# Patient Record
Sex: MALE | Race: WHITE
[De-identification: names, ages, dates, MRNs, and addresses within clinical notes are randomized per-mention and may not be internally consistent; named-entity substitution may affect disease eponyms.]

---

## 2017-03-22 PROBLEM — Z00.00 ENCOUNTER FOR PREVENTIVE HEALTH EXAMINATION: Status: ACTIVE | Noted: 2017-03-22

## 2017-04-04 ENCOUNTER — APPOINTMENT (OUTPATIENT)
Dept: UROLOGY | Facility: CLINIC | Age: 62
End: 2017-04-04

## 2017-04-10 ENCOUNTER — APPOINTMENT (OUTPATIENT)
Dept: UROLOGY | Facility: CLINIC | Age: 62
End: 2017-04-10

## 2017-07-31 ENCOUNTER — APPOINTMENT (OUTPATIENT)
Dept: UROLOGY | Facility: CLINIC | Age: 62
End: 2017-07-31

## 2019-10-07 ENCOUNTER — APPOINTMENT (OUTPATIENT)
Dept: CARDIOLOGY | Facility: CLINIC | Age: 64
End: 2019-10-07
Payer: MEDICARE

## 2019-10-07 PROCEDURE — 99214 OFFICE O/P EST MOD 30 MIN: CPT

## 2019-10-07 PROCEDURE — 93000 ELECTROCARDIOGRAM COMPLETE: CPT

## 2020-06-24 ENCOUNTER — APPOINTMENT (OUTPATIENT)
Dept: CARDIOLOGY | Facility: CLINIC | Age: 65
End: 2020-06-24

## 2021-10-17 ENCOUNTER — TRANSCRIPTION ENCOUNTER (OUTPATIENT)
Age: 66
End: 2021-10-17

## 2022-06-25 ENCOUNTER — NON-APPOINTMENT (OUTPATIENT)
Age: 67
End: 2022-06-25

## 2022-09-20 ENCOUNTER — NON-APPOINTMENT (OUTPATIENT)
Age: 67
End: 2022-09-20

## 2022-09-21 ENCOUNTER — NON-APPOINTMENT (OUTPATIENT)
Age: 67
End: 2022-09-21

## 2022-11-22 ENCOUNTER — APPOINTMENT (OUTPATIENT)
Dept: UROLOGY | Facility: CLINIC | Age: 67
End: 2022-11-22

## 2022-11-22 DIAGNOSIS — Z78.9 OTHER SPECIFIED HEALTH STATUS: ICD-10-CM

## 2022-11-22 DIAGNOSIS — I10 ESSENTIAL (PRIMARY) HYPERTENSION: ICD-10-CM

## 2022-11-22 DIAGNOSIS — E07.9 DISORDER OF THYROID, UNSPECIFIED: ICD-10-CM

## 2022-11-22 DIAGNOSIS — K21.9 GASTRO-ESOPHAGEAL REFLUX DISEASE W/OUT ESOPHAGITIS: ICD-10-CM

## 2022-11-22 PROCEDURE — 99204 OFFICE O/P NEW MOD 45 MIN: CPT

## 2022-11-22 RX ORDER — ALENDRONATE SODIUM 70 MG/1
70 TABLET ORAL
Qty: 12 | Refills: 0 | Status: ACTIVE | COMMUNITY
Start: 2022-09-12

## 2022-11-22 RX ORDER — METFORMIN ER 500 MG 500 MG/1
500 TABLET ORAL
Qty: 180 | Refills: 0 | Status: ACTIVE | COMMUNITY
Start: 2022-09-01

## 2022-11-22 RX ORDER — TAMSULOSIN HYDROCHLORIDE 0.4 MG/1
0.4 CAPSULE ORAL
Qty: 180 | Refills: 0 | Status: ACTIVE | COMMUNITY
Start: 2022-07-11

## 2022-11-22 RX ORDER — AZATHIOPRINE 50 1/1
50 TABLET ORAL
Qty: 540 | Refills: 0 | Status: ACTIVE | COMMUNITY
Start: 2022-04-21

## 2022-11-22 RX ORDER — SIMVASTATIN 20 MG/1
20 TABLET, FILM COATED ORAL
Qty: 90 | Refills: 0 | Status: ACTIVE | COMMUNITY
Start: 2022-05-09

## 2022-11-22 RX ORDER — LEVOTHYROXINE SODIUM 0.05 MG/1
50 TABLET ORAL
Qty: 90 | Refills: 0 | Status: ACTIVE | COMMUNITY
Start: 2022-07-05

## 2022-11-22 RX ORDER — ALPRAZOLAM 0.5 MG/1
0.5 TABLET ORAL
Qty: 60 | Refills: 0 | Status: ACTIVE | COMMUNITY
Start: 2022-05-31

## 2022-11-22 RX ORDER — ROPINIROLE 0.5 MG/1
0.5 TABLET, FILM COATED ORAL
Qty: 180 | Refills: 0 | Status: ACTIVE | COMMUNITY
Start: 2022-09-21

## 2022-11-22 RX ORDER — METOPROLOL SUCCINATE 25 MG/1
25 TABLET, EXTENDED RELEASE ORAL
Qty: 90 | Refills: 0 | Status: ACTIVE | COMMUNITY
Start: 2022-09-15

## 2022-11-22 RX ORDER — TRAZODONE HYDROCHLORIDE 100 MG/1
100 TABLET ORAL
Qty: 90 | Refills: 0 | Status: ACTIVE | COMMUNITY
Start: 2022-07-26

## 2022-11-22 RX ORDER — OXYCODONE AND ACETAMINOPHEN 10; 325 MG/1; MG/1
10-325 TABLET ORAL
Qty: 90 | Refills: 0 | Status: ACTIVE | COMMUNITY
Start: 2022-09-16

## 2022-11-22 RX ORDER — PREDNISONE 20 MG/1
20 TABLET ORAL
Qty: 70 | Refills: 0 | Status: ACTIVE | COMMUNITY
Start: 2022-05-05

## 2022-11-22 RX ORDER — NEOMYCIN SULFATE, POLYMYXIN B SULFATE, HYDROCORTISONE 3.5; 10000; 1 MG/ML; [USP'U]/ML; MG/ML
1 SOLUTION/ DROPS AURICULAR (OTIC)
Qty: 10 | Refills: 0 | Status: ACTIVE | COMMUNITY
Start: 2022-09-21

## 2022-11-22 NOTE — ASSESSMENT
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 Pt is currently on Warfarin. We discussed these issues in detail. It would be nice to remove the catheter as soon as possible. I've asked him to follow up with a VUDS to define whether he has obstruction or a non-functioning bladder. Once that is done, we will make further decisions including teaching the pt CIC if that's the main option available. All his questions were otherwise answered. Total time = 45 mins. \par \par The submitted E/M billing level for this visit reflects the total time spent on the day of the visit including face-to-face time spent with the patient, non-face-to-face review of medical records and relevant information, documentation, and asynchronous communication with the patient after a visit via phone, email, or patient’s EHR portal after the visit. \par The medical records reviewed are either scanned into the chart or reviewed with the patient using a patient’s electronic medical records portal for patients with records not available to [facility] via electronic transmission platforms from other institutions and labs. \par Time spend counseling and performing coordination of care was also included in determining the appropriate EM billing level.\par \par I have reviewed and verified information regarding the chief complaint and history recorded by the ancillary staff and/or the patient. I have independently reviewed and interpreted tests performed by other physicians and facilities as necessary. \par \par I have discussed with the patient differential diagnosis, reason for auxiliary tests if ordered, risks, benefits, alternatives, and complications of each form of therapy were discussed.\par

## 2022-11-22 NOTE — PHYSICAL EXAM
[General Appearance - Well Nourished] : well nourished [Normal Appearance] : normal appearance [General Appearance - In No Acute Distress] : no acute distress [Abdomen Soft] : soft [Abdomen Tenderness] : non-tender [Abdomen Mass (___ Cm)] : no abdominal mass palpated [Costovertebral Angle Tenderness] : no ~M costovertebral angle tenderness [Penis Abnormality] : normal circumcised penis [Oriented To Time, Place, And Person] : oriented to person, place, and time [Affect] : the affect was normal [FreeTextEntry1] : Pt has a Martinez catheter in place. There is some erosion of the meatus. A penile prosthesis in place with no ulceration at the tip of the phallus.

## 2022-11-22 NOTE — HISTORY OF PRESENT ILLNESS
[FreeTextEntry1] : 67 y/o male with a hx of urinary retention and had an indwelling catheter placed about a month ago. He has had several unsuccessful voiding trials and comes in for further evaluation today. Pt has a hx of multiple medical issues including possible CVA, Devic's disease, recent possible PE, and chronic atrial fibrillation. He had a penile prosthesis placed many years ago with possible revision. Pt was fairly functional, but had a fall on a cruise and was admitted to [facility] where some of the diagnosis noted above were made. Records from the hospital was not available today. Being that the pt does have a diagnosis of MS for many years, it is possible that he could have a neurogenic disease, although that is unclear. Pt was seen accompanied by an aid from the nursing home and his sister. \par \par Ultrasonography 11/2/22:\par - no hydronephrosis \par \par Lab Work 10/2022:\par BUN: 16\par Creatinine: 0.9\par eGFR: 93\par hbA1c: 4.8\par \par pmhx: PE, HLD, Type 2 DM, ganglionitis, hypothyroidism, anxiety, amyotrophic lateral sclerosis, multiple sclerosis, atrial fibrillation, osteoporosis, reflux, BPH, anemia. \par \par pshx: penile prosthesis placement\par \par

## 2022-11-28 ENCOUNTER — APPOINTMENT (OUTPATIENT)
Dept: UROLOGY | Facility: CLINIC | Age: 67
End: 2022-11-28

## 2022-12-29 ENCOUNTER — APPOINTMENT (OUTPATIENT)
Dept: UROLOGY | Facility: HOSPITAL | Age: 67
End: 2022-12-29
Payer: MEDICARE

## 2022-12-29 ENCOUNTER — OUTPATIENT (OUTPATIENT)
Dept: OUTPATIENT SERVICES | Facility: HOSPITAL | Age: 67
LOS: 1 days | Discharge: HOME | End: 2022-12-29

## 2022-12-29 DIAGNOSIS — R33.9 RETENTION OF URINE, UNSPECIFIED: ICD-10-CM

## 2022-12-29 PROCEDURE — 51784 ANAL/URINARY MUSCLE STUDY: CPT | Mod: 26

## 2022-12-29 PROCEDURE — 76000 FLUOROSCOPY <1 HR PHYS/QHP: CPT | Mod: 26,59

## 2022-12-29 PROCEDURE — 51797 INTRAABDOMINAL PRESSURE TEST: CPT | Mod: 26

## 2022-12-29 PROCEDURE — 51728 CYSTOMETROGRAM W/VP: CPT | Mod: 26

## 2022-12-29 PROCEDURE — 51600 INJECTION FOR BLADDER X-RAY: CPT

## 2023-03-03 ENCOUNTER — APPOINTMENT (OUTPATIENT)
Dept: UROLOGY | Facility: CLINIC | Age: 68
End: 2023-03-03

## 2023-03-24 ENCOUNTER — OUTPATIENT (OUTPATIENT)
Dept: OUTPATIENT SERVICES | Facility: HOSPITAL | Age: 68
LOS: 1 days | End: 2023-03-24
Payer: MEDICARE

## 2023-03-24 DIAGNOSIS — R10.9 UNSPECIFIED ABDOMINAL PAIN: ICD-10-CM

## 2023-03-24 PROCEDURE — 74177 CT ABD & PELVIS W/CONTRAST: CPT

## 2023-03-24 PROCEDURE — 74177 CT ABD & PELVIS W/CONTRAST: CPT | Mod: 26

## 2023-03-25 DIAGNOSIS — R10.9 UNSPECIFIED ABDOMINAL PAIN: ICD-10-CM

## 2023-05-01 ENCOUNTER — OUTPATIENT (OUTPATIENT)
Dept: OUTPATIENT SERVICES | Facility: HOSPITAL | Age: 68
LOS: 1 days | Discharge: HOME | End: 2023-05-01
Payer: MEDICARE

## 2023-05-01 ENCOUNTER — TRANSCRIPTION ENCOUNTER (OUTPATIENT)
Age: 68
End: 2023-05-01

## 2023-05-01 VITALS
HEIGHT: 65.98 IN | DIASTOLIC BLOOD PRESSURE: 88 MMHG | WEIGHT: 167.99 LBS | RESPIRATION RATE: 18 BRPM | TEMPERATURE: 98 F | HEART RATE: 80 BPM | OXYGEN SATURATION: 99 % | SYSTOLIC BLOOD PRESSURE: 166 MMHG

## 2023-05-01 VITALS
DIASTOLIC BLOOD PRESSURE: 91 MMHG | TEMPERATURE: 97 F | RESPIRATION RATE: 18 BRPM | SYSTOLIC BLOOD PRESSURE: 141 MMHG | OXYGEN SATURATION: 97 % | HEART RATE: 87 BPM

## 2023-05-01 DIAGNOSIS — N40.1 BENIGN PROSTATIC HYPERPLASIA WITH LOWER URINARY TRACT SYMPTOMS: ICD-10-CM

## 2023-05-01 LAB — GLUCOSE BLDC GLUCOMTR-MCNC: 84 MG/DL — SIGNIFICANT CHANGE UP (ref 70–99)

## 2023-05-01 PROCEDURE — 76942 ECHO GUIDE FOR BIOPSY: CPT | Mod: 26

## 2023-05-01 PROCEDURE — 51102 DRAIN BL W/CATH INSERTION: CPT

## 2023-05-01 PROCEDURE — 76942 ECHO GUIDE FOR BIOPSY: CPT

## 2023-05-01 PROCEDURE — 82962 GLUCOSE BLOOD TEST: CPT

## 2023-05-01 PROCEDURE — C1729: CPT

## 2023-05-01 PROCEDURE — C1769: CPT

## 2023-05-01 RX ORDER — CEFTRIAXONE 500 MG/1
1000 INJECTION, POWDER, FOR SOLUTION INTRAMUSCULAR; INTRAVENOUS ONCE
Refills: 0 | Status: DISCONTINUED | OUTPATIENT
Start: 2023-05-01 | End: 2023-05-01

## 2023-05-01 NOTE — CHART NOTE - NSCHARTNOTEFT_GEN_A_CORE
PACU ANESTHESIA ADMISSION NOTE      Procedure:   Post op diagnosis:      ____  Intubated  TV:______       Rate: ______      FiO2: ______    _x___  Patent Airway    _x___  Full return of protective reflexes    _x___  Full recovery from anesthesia / back to baseline status    Vitals:  T(C): 36.1 (05-01-23 @ 10:23), Max: 36.4 (05-01-23 @ 07:12)  HR: 87 (05-01-23 @ 10:23) (77 - 103)  BP: 141/91 (05-01-23 @ 10:23) (116/76 - 166/88)  RR: 18 (05-01-23 @ 10:23) (18 - 18)  SpO2: 97% (05-01-23 @ 10:23) (97% - 99%)    Mental Status:  _x___ Awake   _____ Alert   _____ Drowsy   _____ Sedated    Nausea/Vomiting:  _x___  NO       ______Yes,   See Post - Op Orders         Pain Scale (0-10):  __0___    Treatment: _x___ None    ____ See Post - Op/PCA Orders    Post - Operative Fluids:   __x__ Oral   ____ See Post - Op Orders    Plan: Discharge:   _x___Home       _____Floor     _____Critical Care    _____  Other:_________________    Comments:  No anesthesia issues or complications noted.  Discharge when criteria met.

## 2023-05-01 NOTE — ASU DISCHARGE PLAN (ADULT/PEDIATRIC) - NS MD DC FALL RISK RISK
For information on Fall & Injury Prevention, visit: [email or web address] OR  [email or web address] OR  https://www.cdc.gov/steadi/patient.html

## 2023-05-01 NOTE — ASU PATIENT PROFILE, ADULT - FALL HARM RISK - HARM RISK INTERVENTIONS

## 2023-05-01 NOTE — PROGRESS NOTE ADULT - SUBJECTIVE AND OBJECTIVE BOX
Procedure: Ultrasound and fluoroscopy-guided suprapubic catheter placement. Images saved to PACS.    Clinical Information: Urinary retention with multiple failed with trials. V will require long-term Martinez.    Technique: Informed consent was obtained. The patient was placed supine on the fluoroscopy table. The pelvis was prepped and draped in the usual sterile fashion. Timeout was performed. 1% lidocaine with epinephrine was used for local anesthesia.    Under ultrasound guidance, a 5 Tamazight needle/catheter combination was advanced percutaneously into the bladder, the inner needle was removed. Via the catheter a small amount of contrast was injected confirming position within the bladder. Under fluoroscopic guidance, a 0.035 wire was inserted into the bladder. The tract was serially dilated and eventually a 16 Tamazight locking pigtail catheter was advanced under fluoroscopic guidance. Aspiration of the catheter yielded urine. The catheter was secured to the skin and a dry, sterile gauze was applied. The catheter was placed to gravity drain.    Sedation: Provided by the anesthesiology service.    Impression: Successful 16French suprapubic catheter placement.

## 2023-05-02 PROBLEM — I48.91 UNSPECIFIED ATRIAL FIBRILLATION: Chronic | Status: ACTIVE | Noted: 2023-05-01

## 2023-05-02 PROBLEM — N32.81 OVERACTIVE BLADDER: Chronic | Status: ACTIVE | Noted: 2023-05-01

## 2023-05-09 DIAGNOSIS — R33.9 RETENTION OF URINE, UNSPECIFIED: ICD-10-CM

## 2023-05-22 ENCOUNTER — OUTPATIENT (OUTPATIENT)
Dept: OUTPATIENT SERVICES | Facility: HOSPITAL | Age: 68
LOS: 1 days | Discharge: HOME | End: 2023-05-22
Payer: MEDICARE

## 2023-05-22 ENCOUNTER — TRANSCRIPTION ENCOUNTER (OUTPATIENT)
Age: 68
End: 2023-05-22

## 2023-05-22 VITALS
WEIGHT: 149.91 LBS | OXYGEN SATURATION: 99 % | HEIGHT: 66 IN | RESPIRATION RATE: 14 BRPM | DIASTOLIC BLOOD PRESSURE: 85 MMHG | SYSTOLIC BLOOD PRESSURE: 143 MMHG | HEART RATE: 77 BPM | TEMPERATURE: 97 F

## 2023-05-22 VITALS
DIASTOLIC BLOOD PRESSURE: 97 MMHG | HEART RATE: 81 BPM | OXYGEN SATURATION: 99 % | SYSTOLIC BLOOD PRESSURE: 147 MMHG | RESPIRATION RATE: 15 BRPM

## 2023-05-22 DIAGNOSIS — R33.9 RETENTION OF URINE, UNSPECIFIED: ICD-10-CM

## 2023-05-22 DIAGNOSIS — N40.1 BENIGN PROSTATIC HYPERPLASIA WITH LOWER URINARY TRACT SYMPTOMS: ICD-10-CM

## 2023-05-22 DIAGNOSIS — Z46.6 ENCOUNTER FOR FITTING AND ADJUSTMENT OF URINARY DEVICE: ICD-10-CM

## 2023-05-22 PROCEDURE — C2627: CPT

## 2023-05-22 PROCEDURE — C1769: CPT

## 2023-05-22 PROCEDURE — 51705 CHANGE OF BLADDER TUBE: CPT

## 2023-05-22 PROCEDURE — 75984 XRAY CONTROL CATHETER CHANGE: CPT | Mod: 26

## 2023-05-22 PROCEDURE — 75984 XRAY CONTROL CATHETER CHANGE: CPT

## 2023-05-22 RX ORDER — CEFTRIAXONE 500 MG/1
1000 INJECTION, POWDER, FOR SOLUTION INTRAMUSCULAR; INTRAVENOUS ONCE
Refills: 0 | Status: DISCONTINUED | OUTPATIENT
Start: 2023-05-22 | End: 2023-05-22

## 2023-05-22 NOTE — ASU PATIENT PROFILE, ADULT - NSICDXPASTMEDICALHX_GEN_ALL_CORE_FT
PAST MEDICAL HISTORY:  Atrial fibrillation     BPH (benign prostatic hyperplasia)     Overactive bladder     Type 2 diabetes mellitus

## 2023-05-22 NOTE — ASU PATIENT PROFILE, ADULT - TOBACCO USE
Activity as tolerated. Rest as needed. You may eat a low fat diet. Do not lift anything heavier than 10 pounds. You may take motrin or advil for mild pain as needed, in addition to prescribed narcotic medication. Do not drive while taking narcotic pain medication. You may take over the counter stool softeners as needed. Call for any fever over 101, nausea, vomiting, severe pain, no passing of gas or bowel movement. You may shower and pat dry abdomen. Leave the white steri strips in place; they will fall off in 5-7 days. Former smoker

## 2023-05-22 NOTE — PROGRESS NOTE ADULT - SUBJECTIVE AND OBJECTIVE BOX
INTERVENTIONAL RADIOLOGY BRIEF-OPERATIVE NOTE    Procedure:  Fluoroscopic exchange of SPT    Pre-Op Diagnosis:  Urinary retention    Post-Op Diagnosis:  Same    Attending:  Cyndi (IR) / Yulia (Anaesthesia)  Resident:  None    Antibiotic Prophylaxis:  Ancef, 2g, ivpb    Anesthesia (type):  [X] General Anesthesia  [ ] Sedation  [ ] Spinal Anesthesia  [ ] Local/Regional    Total Face-to-Face Sedation Time:  9 minutes    Contrast:  Visipaque 20cc in 200cc 0.9%NS to urinary bladder, drained    Blood Loss:  0cc    Condition:   [ ] Critical  [ ] Serious  [ ] Fair   [X] Good    Findings/Follow up Plan of Care:  16-English pigtail catheter exchanged over a guidewire for a 16-Fr Councill catheter.  Draining clear urine externally to gravity, post-procedure.  Patient tolerated well, without incident.    Specimens Removed:  None    Implants:  None    Complications:  None immediate.    Disposition:  Discharge home.  Patient should return to IR for routine catheter exchange in 8 weeks.      Please call Interventional Radiology [phone] with any questions, concerns, or issues.

## 2023-05-22 NOTE — ASU DISCHARGE PLAN (ADULT/PEDIATRIC) - PROCEDURE
Fluoroscopically-directed exchange of suprapubic pigtail catheter to [place] catheter Fluoroscopically-directed exchange of suprapubic pigtail catheter to [place] catheter

## 2023-05-22 NOTE — PROGRESS NOTE ADULT - SUBJECTIVE AND OBJECTIVE BOX
PRE-OPERATIVE DAY OF PROCEDURE EVALUATION:     I have personally seen and examined this patient.  I agree with the history and physical which I have reviewed and noted any changes below:     Plan is for fluoroscopic exchange of suprapubic pigtail catheter for [place] catheter with anaesthesia.    Procedure/ risks/ benefits/ goals/ alternatives were explained.  All questions answered. Informed content obtained from patient.  Consent placed in chart.

## 2023-05-24 PROBLEM — N40.0 BENIGN PROSTATIC HYPERPLASIA WITHOUT LOWER URINARY TRACT SYMPTOMS: Chronic | Status: ACTIVE | Noted: 2023-05-22

## 2023-06-18 ENCOUNTER — NON-APPOINTMENT (OUTPATIENT)
Age: 68
End: 2023-06-18

## 2023-06-29 ENCOUNTER — NON-APPOINTMENT (OUTPATIENT)
Age: 68
End: 2023-06-29

## 2023-07-07 ENCOUNTER — APPOINTMENT (OUTPATIENT)
Dept: UROLOGY | Facility: CLINIC | Age: 68
End: 2023-07-07
Payer: MEDICARE

## 2023-07-07 VITALS
RESPIRATION RATE: 16 BRPM | SYSTOLIC BLOOD PRESSURE: 151 MMHG | OXYGEN SATURATION: 98 % | TEMPERATURE: 97.1 F | DIASTOLIC BLOOD PRESSURE: 102 MMHG | HEIGHT: 67 IN | HEART RATE: 72 BPM | WEIGHT: 168 LBS | BODY MASS INDEX: 26.37 KG/M2

## 2023-07-07 PROCEDURE — 51705 CHANGE OF BLADDER TUBE: CPT

## 2023-07-19 NOTE — ASU PREOP CHECKLIST - AS BP NONINV METHOD

## 2023-08-15 ENCOUNTER — APPOINTMENT (OUTPATIENT)
Dept: UROLOGY | Facility: CLINIC | Age: 68
End: 2023-08-15

## 2023-10-03 ENCOUNTER — APPOINTMENT (OUTPATIENT)
Dept: UROLOGY | Facility: CLINIC | Age: 68
End: 2023-10-03
Payer: MEDICARE

## 2023-10-03 PROCEDURE — 51702 INSERT TEMP BLADDER CATH: CPT

## 2023-10-05 ENCOUNTER — APPOINTMENT (OUTPATIENT)
Dept: UROLOGY | Facility: CLINIC | Age: 68
End: 2023-10-05
Payer: MEDICARE

## 2023-10-05 ENCOUNTER — NON-APPOINTMENT (OUTPATIENT)
Age: 68
End: 2023-10-05

## 2023-10-05 PROCEDURE — 99214 OFFICE O/P EST MOD 30 MIN: CPT

## 2023-10-06 ENCOUNTER — NON-APPOINTMENT (OUTPATIENT)
Age: 68
End: 2023-10-06

## 2023-10-07 ENCOUNTER — EMERGENCY (EMERGENCY)
Facility: HOSPITAL | Age: 68
LOS: 0 days | Discharge: HOME | End: 2023-10-07
Attending: EMERGENCY MEDICINE
Payer: MEDICARE

## 2023-10-07 VITALS
SYSTOLIC BLOOD PRESSURE: 149 MMHG | HEART RATE: 73 BPM | TEMPERATURE: 99 F | OXYGEN SATURATION: 99 % | DIASTOLIC BLOOD PRESSURE: 70 MMHG | WEIGHT: 173.06 LBS | RESPIRATION RATE: 18 BRPM

## 2023-10-07 DIAGNOSIS — Z79.01 LONG TERM (CURRENT) USE OF ANTICOAGULANTS: ICD-10-CM

## 2023-10-07 DIAGNOSIS — N40.0 BENIGN PROSTATIC HYPERPLASIA WITHOUT LOWER URINARY TRACT SYMPTOMS: ICD-10-CM

## 2023-10-07 DIAGNOSIS — G35 MULTIPLE SCLEROSIS: ICD-10-CM

## 2023-10-07 DIAGNOSIS — I48.91 UNSPECIFIED ATRIAL FIBRILLATION: ICD-10-CM

## 2023-10-07 DIAGNOSIS — R31.9 HEMATURIA, UNSPECIFIED: ICD-10-CM

## 2023-10-07 DIAGNOSIS — E11.9 TYPE 2 DIABETES MELLITUS WITHOUT COMPLICATIONS: ICD-10-CM

## 2023-10-07 DIAGNOSIS — Z86.73 PERSONAL HISTORY OF TRANSIENT ISCHEMIC ATTACK (TIA), AND CEREBRAL INFARCTION WITHOUT RESIDUAL DEFICITS: ICD-10-CM

## 2023-10-07 DIAGNOSIS — N39.0 URINARY TRACT INFECTION, SITE NOT SPECIFIED: ICD-10-CM

## 2023-10-07 DIAGNOSIS — Z87.442 PERSONAL HISTORY OF URINARY CALCULI: ICD-10-CM

## 2023-10-07 LAB
ANION GAP SERPL CALC-SCNC: 8 MMOL/L — SIGNIFICANT CHANGE UP (ref 7–14)
APPEARANCE UR: ABNORMAL
BASOPHILS # BLD AUTO: 0.03 K/UL — SIGNIFICANT CHANGE UP (ref 0–0.2)
BASOPHILS NFR BLD AUTO: 0.4 % — SIGNIFICANT CHANGE UP (ref 0–1)
BILIRUB UR-MCNC: ABNORMAL
BUN SERPL-MCNC: 17 MG/DL — SIGNIFICANT CHANGE UP (ref 10–20)
CALCIUM SERPL-MCNC: 9.3 MG/DL — SIGNIFICANT CHANGE UP (ref 8.4–10.5)
CHLORIDE SERPL-SCNC: 94 MMOL/L — LOW (ref 98–110)
CO2 SERPL-SCNC: 31 MMOL/L — SIGNIFICANT CHANGE UP (ref 17–32)
COLOR SPEC: ABNORMAL
CREAT SERPL-MCNC: 1.2 MG/DL — SIGNIFICANT CHANGE UP (ref 0.7–1.5)
DIFF PNL FLD: ABNORMAL
EGFR: 65 ML/MIN/1.73M2 — SIGNIFICANT CHANGE UP
EOSINOPHIL # BLD AUTO: 0.13 K/UL — SIGNIFICANT CHANGE UP (ref 0–0.7)
EOSINOPHIL NFR BLD AUTO: 1.7 % — SIGNIFICANT CHANGE UP (ref 0–8)
GLUCOSE SERPL-MCNC: 97 MG/DL — SIGNIFICANT CHANGE UP (ref 70–99)
GLUCOSE UR QL: NEGATIVE MG/DL — SIGNIFICANT CHANGE UP
HCT VFR BLD CALC: 33.3 % — LOW (ref 42–52)
HGB BLD-MCNC: 11.3 G/DL — LOW (ref 14–18)
IMM GRANULOCYTES NFR BLD AUTO: 1.2 % — HIGH (ref 0.1–0.3)
KETONES UR-MCNC: NEGATIVE MG/DL — SIGNIFICANT CHANGE UP
LEUKOCYTE ESTERASE UR-ACNC: ABNORMAL
LYMPHOCYTES # BLD AUTO: 1.66 K/UL — SIGNIFICANT CHANGE UP (ref 1.2–3.4)
LYMPHOCYTES # BLD AUTO: 22.2 % — SIGNIFICANT CHANGE UP (ref 20.5–51.1)
MCHC RBC-ENTMCNC: 31.8 PG — HIGH (ref 27–31)
MCHC RBC-ENTMCNC: 33.9 G/DL — SIGNIFICANT CHANGE UP (ref 32–37)
MCV RBC AUTO: 93.8 FL — SIGNIFICANT CHANGE UP (ref 80–94)
MONOCYTES # BLD AUTO: 0.54 K/UL — SIGNIFICANT CHANGE UP (ref 0.1–0.6)
MONOCYTES NFR BLD AUTO: 7.2 % — SIGNIFICANT CHANGE UP (ref 1.7–9.3)
NEUTROPHILS # BLD AUTO: 5.02 K/UL — SIGNIFICANT CHANGE UP (ref 1.4–6.5)
NEUTROPHILS NFR BLD AUTO: 67.3 % — SIGNIFICANT CHANGE UP (ref 42.2–75.2)
NITRITE UR-MCNC: POSITIVE
NRBC # BLD: 0 /100 WBCS — SIGNIFICANT CHANGE UP (ref 0–0)
PH UR: 5.5 — SIGNIFICANT CHANGE UP (ref 5–8)
PLATELET # BLD AUTO: 144 K/UL — SIGNIFICANT CHANGE UP (ref 130–400)
PMV BLD: 11.2 FL — HIGH (ref 7.4–10.4)
POTASSIUM SERPL-MCNC: 4 MMOL/L — SIGNIFICANT CHANGE UP (ref 3.5–5)
POTASSIUM SERPL-SCNC: 4 MMOL/L — SIGNIFICANT CHANGE UP (ref 3.5–5)
PROT UR-MCNC: 100 MG/DL
RBC # BLD: 3.55 M/UL — LOW (ref 4.7–6.1)
RBC # FLD: 14.7 % — HIGH (ref 11.5–14.5)
SODIUM SERPL-SCNC: 133 MMOL/L — LOW (ref 135–146)
SP GR SPEC: 1.01 — SIGNIFICANT CHANGE UP (ref 1–1.03)
UROBILINOGEN FLD QL: 0.2 MG/DL — SIGNIFICANT CHANGE UP (ref 0.2–1)
WBC # BLD: 7.47 K/UL — SIGNIFICANT CHANGE UP (ref 4.8–10.8)
WBC # FLD AUTO: 7.47 K/UL — SIGNIFICANT CHANGE UP (ref 4.8–10.8)

## 2023-10-07 PROCEDURE — 81001 URINALYSIS AUTO W/SCOPE: CPT

## 2023-10-07 PROCEDURE — 80048 BASIC METABOLIC PNL TOTAL CA: CPT

## 2023-10-07 PROCEDURE — 99285 EMERGENCY DEPT VISIT HI MDM: CPT

## 2023-10-07 PROCEDURE — 99284 EMERGENCY DEPT VISIT MOD MDM: CPT

## 2023-10-07 PROCEDURE — 87077 CULTURE AEROBIC IDENTIFY: CPT

## 2023-10-07 PROCEDURE — 85025 COMPLETE CBC W/AUTO DIFF WBC: CPT

## 2023-10-07 PROCEDURE — 36415 COLL VENOUS BLD VENIPUNCTURE: CPT

## 2023-10-07 PROCEDURE — 87186 SC STD MICRODIL/AGAR DIL: CPT

## 2023-10-07 PROCEDURE — 87086 URINE CULTURE/COLONY COUNT: CPT

## 2023-10-07 RX ORDER — CIPROFLOXACIN LACTATE 400MG/40ML
1 VIAL (ML) INTRAVENOUS
Qty: 20 | Refills: 0
Start: 2023-10-07 | End: 2023-10-16

## 2023-10-07 RX ORDER — KETOROLAC TROMETHAMINE 30 MG/ML
15 SYRINGE (ML) INJECTION ONCE
Refills: 0 | Status: DISCONTINUED | OUTPATIENT
Start: 2023-10-07 | End: 2023-10-07

## 2023-10-07 RX ORDER — CIPROFLOXACIN LACTATE 400MG/40ML
1 VIAL (ML) INTRAVENOUS
Qty: 14 | Refills: 0
Start: 2023-10-07 | End: 2023-10-13

## 2023-10-07 RX ORDER — SODIUM CHLORIDE 9 MG/ML
1000 INJECTION INTRAMUSCULAR; INTRAVENOUS; SUBCUTANEOUS ONCE
Refills: 0 | Status: DISCONTINUED | OUTPATIENT
Start: 2023-10-07 | End: 2023-10-07

## 2023-10-07 RX ORDER — FAMOTIDINE 10 MG/ML
20 INJECTION INTRAVENOUS ONCE
Refills: 0 | Status: DISCONTINUED | OUTPATIENT
Start: 2023-10-07 | End: 2023-10-07

## 2023-10-07 NOTE — ED PROVIDER NOTE - CARE PROVIDER_API CALL
Yung Cedeno  Urology  [address] [place] [id number]  Phone: [phone]  Fax: [phone]  Follow Up Time: 4-6 Days

## 2023-10-07 NOTE — ED PROVIDER NOTE - CLINICAL SUMMARY MEDICAL DECISION MAKING FREE TEXT BOX
68-year-old male with hematuria with indwelling Martinez catheter.  Afebrile, appears well.  Vital signs reviewed.  Labs ordered and reviewed, patient was found to have UTI.  He was evaluated by urology service, bladder was flushed by them, cleared for discharge, advised to follow-up with Dr. Cedeno outpatient as previously scheduled, prescription for antibiotic was sent to the patient's pharmacy.  Strict return precautions discussed with the patient and his caregiver, all their questions/concerns were addressed and answered.  They verbalized understanding and are amenable with the discharge plan.

## 2023-10-07 NOTE — ED ADULT TRIAGE NOTE - CHIEF COMPLAINT QUOTE
BIB family for blood in urine in calvert catheter that placed last Wed 10/4. BIB family for blood in urine in calvert catheter placed last Wed 10/4. Has h/o MS.

## 2023-10-07 NOTE — CONSULT NOTE ADULT - ASSESSMENT
69 y/o male with hematuria s/p recent SPT catheter change on 10/4. On Eliquis for Afib. Hx of bladder stones.  - Urine cleared after irrigation  - Hgb stable, pt asymptomatic  - UA grossly positive  - PO abx  - Increase fluid intake  - F/u with Dr. Cedeno as scheduled  - Return to ED if hematuria worsens or catheter stops draining.  - Pt and care giver understand and agree with plan.

## 2023-10-07 NOTE — ED PROVIDER NOTE - ATTENDING CONTRIBUTION TO CARE
68-year-old male PMH MS, CVA, overactive bladder, A-fib on Eliquis, DM, BPH, indwelling Martinez catheter which was recently replaced by his urologist presenting to ED for evaluation of hematuria since yesterday.  No fever chills, nausea vomiting, dizziness lightheadedness, chest pains or shortness of breath, Martinez is draining well.  Patient sitting in stretcher, no acute distress, awake and alert, speaking full sentences, lungs clear to auscultation, no midline spinal CVA TTP, abdomen soft nontender to palpation.  Plan: Labs, urology consult, dispo accordingly.  Patient and family are amenable to plan.

## 2023-10-07 NOTE — ED PROVIDER NOTE - OBJECTIVE STATEMENT
68-year-old male with past medical history of stroke, overactive bladder, A-fib on Eliquis, MS, BPH, DM presenting with blood in suprapubic Martinez catheter.  Patient reports catheter was replaced on Wednesday, 10/4/2023.  Patient follows with Dr. SPRAGUE, urology.  Patient had suprapubic catheter initially placed in May and this is the third time its been replaced.  Patient reports he has been noticing blood since it was replaced.  No fever, chills, nausea, vomiting, abdominal pain, pain or discharge with urination, dizziness, lightheadedness, headache, weakness, shortness of breath, chest pain.

## 2023-10-07 NOTE — ED ADULT NURSE NOTE - NSFALLHARMRISKINTERV_ED_ALL_ED

## 2023-10-07 NOTE — CONSULT NOTE ADULT - SUBJECTIVE AND OBJECTIVE BOX
HPI:  68-year-old male with past medical history of stroke, overactive bladder, A-fib on Eliquis, MS, BPH, DM presenting with hematuria via suprapubic Matrinez catheter.  Patient reports catheter was replaced on Wednesday, 10/4/2023. Pt has hx of bladder stones as well and may likely be the reason for the hematuria. Pt and caregiver at bedside both state that he occasionally is hematuric and it clears up on its own. This episode lasted longer than usual hence the ED visit. Pt is currently on Eliquis.     SPT cath irrigated with 750cc sterile water. Moderate amount of clot evacuated. Pt tolerated procedure well without any complications. Now draining clear slightly pink tinged urine.     PAST MEDICAL & SURGICAL HISTORY:  Type 2 diabetes mellitus  Atrial fibrillation  Overactive bladder  BPH (benign prostatic hyperplasia)    MEDICATIONS  (STANDING):  famotidine Injectable 20 milliGRAM(s) IV Push once  sodium chloride 0.9% Bolus 1000 milliLiter(s) IV Bolus once    Allergies  No Known Allergies    REVIEW OF SYSTEMS   [x] A ten-point review of systems was otherwise negative except as noted.    Vital Signs Last 24 Hrs  T(C): 37.1 (07 Oct 2023 10:00), Max: 37.1 (07 Oct 2023 10:00)  T(F): 98.7 (07 Oct 2023 10:00), Max: 98.7 (07 Oct 2023 10:00)  HR: 73 (07 Oct 2023 10:00) (73 - 73)  BP: 149/70 (07 Oct 2023 10:00) (149/70 - 149/70)  RR: 18 (07 Oct 2023 10:00) (18 - 18)  SpO2: 99% (07 Oct 2023 10:00) (99% - 99%)    Parameters below as of 07 Oct 2023 10:00  Patient On (Oxygen Delivery Method): room air    PHYSICAL EXAM:  GEN: NAD, awake and alert.  SKIN: Good color, non diaphoretic.  RESP: Non-labored breathing. No use of accessory muscles.  CARDIO: +S1/S2  ABDO: Soft, NT/ND, no palpable bladder, no suprapubic tenderness/ + Suprapubic Tube draining clear slightly pink tinged urine after manual irrigation.  BACK: No CVAT B/L    LABS:                        11.3   7.47  )-----------( 144      ( 07 Oct 2023 11:56 )             33.3     Urinalysis Basic - ( 07 Oct 2023 11:56 )    Color: Red / Appearance: Turbid / S.008 / pH: x  Gluc: x / Ketone: Negative mg/dL  / Bili: Moderate / Urobili: 0.2 mg/dL   Blood: x / Protein: 100 mg/dL / Nitrite: Positive   Leuk Esterase: Large / RBC: x / WBC x   Sq Epi: x / Non Sq Epi: x / Bacteria: x

## 2023-10-07 NOTE — ED PROVIDER NOTE - PATIENT PORTAL LINK FT
You can access the FollowMyHealth Patient Portal offered by [facility] by registering at the following website: [email or web address]. By joining [email or web address]’s FollowMyHealth portal, you will also be able to view your health information using other applications (apps) compatible with our system.

## 2023-10-07 NOTE — ED PROVIDER NOTE - NSFOLLOWUPINSTRUCTIONS_ED_ALL_ED_FT
- If you experience increased urinary frequency, burning with urination, fevers (temp >100.4F), please contact your doctor immediately.  - Take medication as prescribed  - Follow up with primary care physician and urologist     Return if symptoms recur or worsen. - If you experience increased urinary frequency, burning with urination, fevers (temp >100.4F), please contact your doctor immediately.  - Take medication as prescribed  - Follow up with primary care physician and urologist     Return if symptoms recur or worsen.  Urinary Tract Infection    A urinary tract infection (UTI) is an infection of any part of the urinary tract, which includes the kidneys, ureters, bladder, and urethra. Risk factors include ignoring your need to urinate, wiping back to front if female, being an uncircumcised male, and having diabetes or a weak immune system. Symptoms include frequent urination, pain or burning with urination, foul smelling urine, cloudy urine, pain in the lower abdomen, blood in the urine, and fever. If you were prescribed an antibiotic medicine, take it as told by your health care provider. Do not stop taking the antibiotic even if you start to feel better.    SEEK IMMEDIATE MEDICAL CARE IF YOU HAVE THE FOLLOWING SYMPTOMS: severe back or abdominal pain, inability to keep fluids or medicine down, dizziness/lightheadedness, or a change in mental status.

## 2023-10-07 NOTE — ED PROVIDER NOTE - CARE PLAN
1 Principal Discharge DX:	Acute UTI   Principal Discharge DX:	Acute UTI  Secondary Diagnosis:	Hematuria

## 2023-10-07 NOTE — ED PROVIDER NOTE - PHYSICAL EXAMINATION
Constitutional: Well developed, well nourished, no acute distress  Head: Normocephalic, Atraumatic, baseline residual right sided facial droop from previous stoke   Eyes: PERRLA, EOMI, conjunctiva and sclera WNL  ENT: Moist mucous membranes, no rhinorrhea,   Neck: Supple, Nontender   Respiratory: Normal chest excursion with respiration; Breath sounds clear and equal B/L; No wheezes, rales, or rhonchi   Cardiovascular: RRR; Normal S1, S2; No murmurs, rubs or gallops   ABD/GI:   Nondistended; Nontender; No guarding, rigidity or rebound; suprapubic catheter with no surrounding exudate, blood, drainage or erythema    EXT/MS: baseline right sided lower extremity residual weakness from previous stroke ; Distal pulses 2+ B/L, moving all extremities   Skin: Normal for age and race; Warm and dry; No rash  Neurologic: AAO x 4; CN 2-12 intact; Normal sensory function  Psychiatric: Appropriate affect, normal mood

## 2023-10-16 ENCOUNTER — APPOINTMENT (OUTPATIENT)
Dept: UROLOGY | Facility: CLINIC | Age: 68
End: 2023-10-16
Payer: MEDICARE

## 2023-10-16 PROCEDURE — 52000 CYSTOURETHROSCOPY: CPT

## 2023-12-11 ENCOUNTER — APPOINTMENT (OUTPATIENT)
Dept: UROLOGY | Facility: CLINIC | Age: 68
End: 2023-12-11
Payer: MEDICARE

## 2023-12-11 PROCEDURE — 51705 CHANGE OF BLADDER TUBE: CPT

## 2024-01-22 ENCOUNTER — APPOINTMENT (OUTPATIENT)
Dept: UROLOGY | Facility: CLINIC | Age: 69
End: 2024-01-22
Payer: MEDICARE

## 2024-01-22 VITALS
BODY MASS INDEX: 26.37 KG/M2 | OXYGEN SATURATION: 97 % | HEIGHT: 67 IN | SYSTOLIC BLOOD PRESSURE: 139 MMHG | RESPIRATION RATE: 17 BRPM | DIASTOLIC BLOOD PRESSURE: 75 MMHG | WEIGHT: 168 LBS | HEART RATE: 99 BPM | TEMPERATURE: 97.2 F

## 2024-01-22 PROCEDURE — 51702 INSERT TEMP BLADDER CATH: CPT

## 2024-01-29 RX ORDER — AMOXICILLIN AND CLAVULANATE POTASSIUM 500; 125 MG/1; MG/1
500-125 TABLET, FILM COATED ORAL
Qty: 14 | Refills: 0 | Status: ACTIVE | COMMUNITY
Start: 2024-01-29 | End: 1900-01-01

## 2024-02-22 ENCOUNTER — OUTPATIENT (OUTPATIENT)
Dept: OUTPATIENT SERVICES | Facility: HOSPITAL | Age: 69
LOS: 1 days | End: 2024-02-22
Payer: MEDICARE

## 2024-02-22 VITALS
OXYGEN SATURATION: 100 % | WEIGHT: 169.98 LBS | SYSTOLIC BLOOD PRESSURE: 136 MMHG | RESPIRATION RATE: 16 BRPM | HEIGHT: 67 IN | TEMPERATURE: 98 F | HEART RATE: 97 BPM | DIASTOLIC BLOOD PRESSURE: 95 MMHG

## 2024-02-22 DIAGNOSIS — N21.0 CALCULUS IN BLADDER: ICD-10-CM

## 2024-02-22 DIAGNOSIS — Z98.890 OTHER SPECIFIED POSTPROCEDURAL STATES: Chronic | ICD-10-CM

## 2024-02-22 DIAGNOSIS — Z96.0 PRESENCE OF UROGENITAL IMPLANTS: Chronic | ICD-10-CM

## 2024-02-22 DIAGNOSIS — Z01.818 ENCOUNTER FOR OTHER PREPROCEDURAL EXAMINATION: ICD-10-CM

## 2024-02-22 LAB
A1C WITH ESTIMATED AVERAGE GLUCOSE RESULT: 6.3 % — HIGH (ref 4–5.6)
ALBUMIN SERPL ELPH-MCNC: 4.9 G/DL — SIGNIFICANT CHANGE UP (ref 3.5–5.2)
ALP SERPL-CCNC: 54 U/L — SIGNIFICANT CHANGE UP (ref 30–115)
ALT FLD-CCNC: 15 U/L — SIGNIFICANT CHANGE UP (ref 0–41)
ANION GAP SERPL CALC-SCNC: 14 MMOL/L — SIGNIFICANT CHANGE UP (ref 7–14)
APTT BLD: 27 SEC — SIGNIFICANT CHANGE UP (ref 27–39.2)
AST SERPL-CCNC: 19 U/L — SIGNIFICANT CHANGE UP (ref 0–41)
BASOPHILS # BLD AUTO: 0.02 K/UL — SIGNIFICANT CHANGE UP (ref 0–0.2)
BASOPHILS NFR BLD AUTO: 0.2 % — SIGNIFICANT CHANGE UP (ref 0–1)
BILIRUB SERPL-MCNC: 0.3 MG/DL — SIGNIFICANT CHANGE UP (ref 0.2–1.2)
BUN SERPL-MCNC: 26 MG/DL — HIGH (ref 10–20)
CALCIUM SERPL-MCNC: 9.8 MG/DL — SIGNIFICANT CHANGE UP (ref 8.4–10.5)
CHLORIDE SERPL-SCNC: 103 MMOL/L — SIGNIFICANT CHANGE UP (ref 98–110)
CO2 SERPL-SCNC: 25 MMOL/L — SIGNIFICANT CHANGE UP (ref 17–32)
CREAT SERPL-MCNC: 1.1 MG/DL — SIGNIFICANT CHANGE UP (ref 0.7–1.5)
EGFR: 73 ML/MIN/1.73M2 — SIGNIFICANT CHANGE UP
EOSINOPHIL # BLD AUTO: 0.17 K/UL — SIGNIFICANT CHANGE UP (ref 0–0.7)
EOSINOPHIL NFR BLD AUTO: 1.9 % — SIGNIFICANT CHANGE UP (ref 0–8)
ESTIMATED AVERAGE GLUCOSE: 134 MG/DL — HIGH (ref 68–114)
GLUCOSE SERPL-MCNC: 97 MG/DL — SIGNIFICANT CHANGE UP (ref 70–99)
HCT VFR BLD CALC: 34.1 % — LOW (ref 42–52)
HGB BLD-MCNC: 10.6 G/DL — LOW (ref 14–18)
IMM GRANULOCYTES NFR BLD AUTO: 0.5 % — HIGH (ref 0.1–0.3)
INR BLD: 1.64 RATIO — HIGH (ref 0.65–1.3)
LYMPHOCYTES # BLD AUTO: 1.2 K/UL — SIGNIFICANT CHANGE UP (ref 1.2–3.4)
LYMPHOCYTES # BLD AUTO: 13.1 % — LOW (ref 20.5–51.1)
MCHC RBC-ENTMCNC: 24.4 PG — LOW (ref 27–31)
MCHC RBC-ENTMCNC: 31.1 G/DL — LOW (ref 32–37)
MCV RBC AUTO: 78.6 FL — LOW (ref 80–94)
MONOCYTES # BLD AUTO: 0.63 K/UL — HIGH (ref 0.1–0.6)
MONOCYTES NFR BLD AUTO: 6.9 % — SIGNIFICANT CHANGE UP (ref 1.7–9.3)
NEUTROPHILS # BLD AUTO: 7.06 K/UL — HIGH (ref 1.4–6.5)
NEUTROPHILS NFR BLD AUTO: 77.4 % — HIGH (ref 42.2–75.2)
NRBC # BLD: 0 /100 WBCS — SIGNIFICANT CHANGE UP (ref 0–0)
PLATELET # BLD AUTO: 242 K/UL — SIGNIFICANT CHANGE UP (ref 130–400)
PMV BLD: 11 FL — HIGH (ref 7.4–10.4)
POTASSIUM SERPL-MCNC: 4 MMOL/L — SIGNIFICANT CHANGE UP (ref 3.5–5)
POTASSIUM SERPL-SCNC: 4 MMOL/L — SIGNIFICANT CHANGE UP (ref 3.5–5)
PROT SERPL-MCNC: 8.3 G/DL — HIGH (ref 6–8)
PROTHROM AB SERPL-ACNC: 18.8 SEC — HIGH (ref 9.95–12.87)
RBC # BLD: 4.34 M/UL — LOW (ref 4.7–6.1)
RBC # FLD: 18.8 % — HIGH (ref 11.5–14.5)
SODIUM SERPL-SCNC: 142 MMOL/L — SIGNIFICANT CHANGE UP (ref 135–146)
WBC # BLD: 9.13 K/UL — SIGNIFICANT CHANGE UP (ref 4.8–10.8)
WBC # FLD AUTO: 9.13 K/UL — SIGNIFICANT CHANGE UP (ref 4.8–10.8)

## 2024-02-22 PROCEDURE — 85025 COMPLETE CBC W/AUTO DIFF WBC: CPT

## 2024-02-22 PROCEDURE — 85610 PROTHROMBIN TIME: CPT

## 2024-02-22 PROCEDURE — 93010 ELECTROCARDIOGRAM REPORT: CPT

## 2024-02-22 PROCEDURE — 36415 COLL VENOUS BLD VENIPUNCTURE: CPT

## 2024-02-22 PROCEDURE — 85730 THROMBOPLASTIN TIME PARTIAL: CPT

## 2024-02-22 PROCEDURE — 99214 OFFICE O/P EST MOD 30 MIN: CPT | Mod: 25

## 2024-02-22 PROCEDURE — 83036 HEMOGLOBIN GLYCOSYLATED A1C: CPT

## 2024-02-22 PROCEDURE — 80053 COMPREHEN METABOLIC PANEL: CPT

## 2024-02-22 PROCEDURE — 93005 ELECTROCARDIOGRAM TRACING: CPT

## 2024-02-22 NOTE — H&P PST ADULT - NSICDXPASTMEDICALHX_GEN_ALL_CORE_FT
PAST MEDICAL HISTORY:  Atrial fibrillation     BPH (benign prostatic hyperplasia)     CVA (cerebrovascular accident)     Kidney stone     Multiple sclerosis     Overactive bladder     Suprapubic catheter     Type 2 diabetes mellitus

## 2024-02-22 NOTE — H&P PST ADULT - HISTORY OF PRESENT ILLNESS
69 y/o male presents to PAST in preparation for cystoscopy litholapaxy bladder stone in [facility] under [place] with Dr. Cedeno on 3/7/24    Pt states that he has kidney stones that are large in size that were found a few yrs ago. Pt currently with SPT in place following having a CVA causing urinary retention CVA in 10/22. Pt now for above procedure due to kidney stones.  SPT in place     PATIENT CURRENTLY DENIES CHEST PAIN  SHORTNESS OF BREATH  PALPITATIONS,  DYSURIA, OR UPPER RESPIRATORY INFECTION IN PAST 2 WEEKS  Patient verbalized understanding of instructions and was given the opportunity to ask questions and have them answered.  As per patient, this is their complete medical and surgical history, including medications both prescribed or over the counter.  written and verbal instructions with teach back on chlorhexidine shampoo provided,  pt verbalized understanding with returned demonstration    Anesthesia Alert  NO--Difficult Airway  NO--History of neck surgery or radiation  NO--Limited ROM of neck  NO--History of Malignant hyperthermia  NO--Personal or family history of Pseudocholinesterase deficiency.  NO--Prior Anesthesia Complication  NO--Latex Allergy  NO--Loose teeth  NO--History of Rheumatoid Arthritis  NO--FRANKIE  NO--Bleeding risk- eliqius   NO--Other_____  Mallampati airway: Class III  Right sided hemiparalysis     [facility] Activity Status Index (DASI) from [employer]  on 2/22/2024      RESULT SUMMARY:  4.5 points  The higher the score (maximum 58.2), the higher the functional status.    3.30 METs        INPUTS:  Take care of self —> 2.75 = Yes  Walk indoors —> 1.75 = Yes  Walk 1&ndash;2 blocks on level ground —> 0 = No  Climb a flight of stairs or walk up a hill —> 0 = No  Run a short distance —> 0 = No  Do light work around the house —> 0 = No  Do moderate work around the house —> 0 = No  Do heavy work around the house —> 0 = No  Do yardwork —> 0 = No  Have sexual relations —> 0 = No  Participate in moderate recreational activities —> 0 = No  Participate in strenuous sports —> 0 = No    Revised Cardiac Risk Index for Pre-Operative Risk from [employer]  on 2/22/2024      RESULT SUMMARY:  2 points  Class III Risk    10.1 %  30-day risk of death, MI, or cardiac arrest    INPUTS:  Elevated-risk surgery —> 0 = No  History of ischemic heart disease —> 1 = Yes  History of congestive heart failure —> 0 = No  History of cerebrovascular disease —> 1 = Yes  Pre-operative treatment with insulin —> 0 = No  Pre-operative creatinine >2 mg/dL / 176.8 µmol/L —> 0 = No      Calculus of urinary bladder    Encounter for other preprocedural examination    Type 2 diabetes mellitus    Atrial fibrillation    Overactive bladder    BPH (benign prostatic hyperplasia)    88802;[id number]    [email or web address]

## 2024-02-22 NOTE — H&P PST ADULT - NSICDXPASTSURGICALHX_GEN_ALL_CORE_FT
PAST SURGICAL HISTORY:  H/O hernia repair     History of penile implant     History of suprapubic catheter

## 2024-02-22 NOTE — H&P PST ADULT - REASON FOR ADMISSION
69 y/o male presents to PAST in preparation for cystoscopy litholapaxy bladder stone in [facility] under [facility] with Dr. Cedeno on 3/7/24

## 2024-02-23 DIAGNOSIS — Z01.818 ENCOUNTER FOR OTHER PREPROCEDURAL EXAMINATION: ICD-10-CM

## 2024-02-23 DIAGNOSIS — N21.0 CALCULUS IN BLADDER: ICD-10-CM

## 2024-03-07 ENCOUNTER — OUTPATIENT (OUTPATIENT)
Dept: OUTPATIENT SERVICES | Facility: HOSPITAL | Age: 69
LOS: 1 days | Discharge: HOME | End: 2024-03-07
Payer: MEDICARE

## 2024-03-07 ENCOUNTER — APPOINTMENT (OUTPATIENT)
Dept: UROLOGY | Facility: HOSPITAL | Age: 69
End: 2024-03-07

## 2024-03-07 ENCOUNTER — TRANSCRIPTION ENCOUNTER (OUTPATIENT)
Age: 69
End: 2024-03-07

## 2024-03-07 VITALS
DIASTOLIC BLOOD PRESSURE: 91 MMHG | HEIGHT: 67 IN | WEIGHT: 175.05 LBS | TEMPERATURE: 96 F | HEART RATE: 85 BPM | RESPIRATION RATE: 20 BRPM | SYSTOLIC BLOOD PRESSURE: 143 MMHG | OXYGEN SATURATION: 100 %

## 2024-03-07 VITALS — HEART RATE: 77 BPM | SYSTOLIC BLOOD PRESSURE: 137 MMHG | RESPIRATION RATE: 16 BRPM | DIASTOLIC BLOOD PRESSURE: 63 MMHG

## 2024-03-07 DIAGNOSIS — Z98.890 OTHER SPECIFIED POSTPROCEDURAL STATES: Chronic | ICD-10-CM

## 2024-03-07 DIAGNOSIS — N21.0 CALCULUS IN BLADDER: ICD-10-CM

## 2024-03-07 DIAGNOSIS — Z96.0 PRESENCE OF UROGENITAL IMPLANTS: Chronic | ICD-10-CM

## 2024-03-07 LAB — GLUCOSE BLDC GLUCOMTR-MCNC: 103 MG/DL — HIGH (ref 70–99)

## 2024-03-07 PROCEDURE — C1889: CPT

## 2024-03-07 PROCEDURE — 82962 GLUCOSE BLOOD TEST: CPT

## 2024-03-07 PROCEDURE — C1758: CPT

## 2024-03-07 PROCEDURE — 82365 CALCULUS SPECTROSCOPY: CPT

## 2024-03-07 PROCEDURE — 52318 REMOVE BLADDER STONE: CPT

## 2024-03-07 PROCEDURE — 51705 CHANGE OF BLADDER TUBE: CPT

## 2024-03-07 RX ORDER — ONDANSETRON 8 MG/1
4 TABLET, FILM COATED ORAL ONCE
Refills: 0 | Status: DISCONTINUED | OUTPATIENT
Start: 2024-03-07 | End: 2024-03-07

## 2024-03-07 RX ORDER — SODIUM CHLORIDE 9 MG/ML
1000 INJECTION, SOLUTION INTRAVENOUS
Refills: 0 | Status: DISCONTINUED | OUTPATIENT
Start: 2024-03-07 | End: 2024-03-07

## 2024-03-07 RX ORDER — METOPROLOL TARTRATE 50 MG
0 TABLET ORAL
Qty: 0 | Refills: 0 | DISCHARGE

## 2024-03-07 RX ORDER — HYDROMORPHONE HYDROCHLORIDE 2 MG/ML
0.5 INJECTION INTRAMUSCULAR; INTRAVENOUS; SUBCUTANEOUS
Refills: 0 | Status: DISCONTINUED | OUTPATIENT
Start: 2024-03-07 | End: 2024-03-07

## 2024-03-07 NOTE — PRE-ANESTHESIA EVALUATION ADULT - NSANTHPMHFT_GEN_ALL_CORE
cva w right sided weakness 2022, residual right side weakness and facial droop  afib on eliquis last taken saturday

## 2024-03-07 NOTE — ASU DISCHARGE PLAN (ADULT/PEDIATRIC) - NS MD DC FALL RISK RISK
For information on Fall & Injury Prevention, visit: [email or web address] OR  [email or web address] OR  https://www.cdc.gov/steadi/patient.html

## 2024-03-07 NOTE — ASU PATIENT PROFILE, ADULT - FALL HARM RISK - HARM RISK INTERVENTIONS
Assistance with ambulation/Assistance OOB with selected safe patient handling equipment/Communicate Risk of Fall with Harm to all staff/Discuss with provider need for PT consult/Monitor gait and stability/Provide patient with walking aids - walker, cane, crutches/Reinforce activity limits and safety measures with patient and family/Sit up slowly, dangle for a short time, stand at bedside before walking/Tailored Fall Risk Interventions/Use of alarms - bed, chair and/or voice tab/Visual Cue: Yellow wristband and red socks/Bed in lowest position, wheels locked, appropriate side rails in place/Call bell, personal items and telephone in reach/Instruct patient to call for assistance before getting out of bed or chair/Non-slip footwear when patient is out of bed/[place] to call system/Physically safe environment - no spills, clutter or unnecessary equipment/Purposeful Proactive Rounding/Room/bathroom lighting operational, light cord in reach

## 2024-03-11 PROBLEM — I63.9 CEREBRAL INFARCTION, UNSPECIFIED: Chronic | Status: ACTIVE | Noted: 2024-02-22

## 2024-03-11 PROBLEM — G35 MULTIPLE SCLEROSIS: Chronic | Status: ACTIVE | Noted: 2024-02-22

## 2024-03-11 PROBLEM — Z93.59 OTHER CYSTOSTOMY STATUS: Chronic | Status: ACTIVE | Noted: 2024-02-22

## 2024-03-11 PROBLEM — N20.0 CALCULUS OF KIDNEY: Chronic | Status: ACTIVE | Noted: 2024-02-22

## 2024-03-12 DIAGNOSIS — N21.0 CALCULUS IN BLADDER: ICD-10-CM

## 2024-03-12 DIAGNOSIS — N31.9 NEUROMUSCULAR DYSFUNCTION OF BLADDER, UNSPECIFIED: ICD-10-CM

## 2024-03-12 DIAGNOSIS — I48.91 UNSPECIFIED ATRIAL FIBRILLATION: ICD-10-CM

## 2024-03-12 DIAGNOSIS — N40.0 BENIGN PROSTATIC HYPERPLASIA WITHOUT LOWER URINARY TRACT SYMPTOMS: ICD-10-CM

## 2024-03-12 DIAGNOSIS — Z86.73 PERSONAL HISTORY OF TRANSIENT ISCHEMIC ATTACK (TIA), AND CEREBRAL INFARCTION WITHOUT RESIDUAL DEFICITS: ICD-10-CM

## 2024-03-12 DIAGNOSIS — Z79.01 LONG TERM (CURRENT) USE OF ANTICOAGULANTS: ICD-10-CM

## 2024-03-12 DIAGNOSIS — Z79.84 LONG TERM (CURRENT) USE OF ORAL HYPOGLYCEMIC DRUGS: ICD-10-CM

## 2024-03-12 DIAGNOSIS — Z87.891 PERSONAL HISTORY OF NICOTINE DEPENDENCE: ICD-10-CM

## 2024-03-12 DIAGNOSIS — E11.9 TYPE 2 DIABETES MELLITUS WITHOUT COMPLICATIONS: ICD-10-CM

## 2024-03-12 LAB
CELL MATERIAL STONE EST-MCNT: SIGNIFICANT CHANGE UP
LABORATORY COMMENT REPORT: SIGNIFICANT CHANGE UP
NIDUS STONE QN: SIGNIFICANT CHANGE UP

## 2024-04-05 ENCOUNTER — APPOINTMENT (OUTPATIENT)
Dept: UROLOGY | Facility: CLINIC | Age: 69
End: 2024-04-05
Payer: MEDICARE

## 2024-04-05 DIAGNOSIS — R31.0 GROSS HEMATURIA: ICD-10-CM

## 2024-04-05 PROCEDURE — 51705 CHANGE OF BLADDER TUBE: CPT

## 2024-04-05 RX ORDER — APIXABAN 5 MG/1
5 TABLET, FILM COATED ORAL
Refills: 0 | Status: ACTIVE | COMMUNITY

## 2024-04-30 NOTE — ED ADULT NURSE NOTE - SUICIDE SCREENING DEPRESSION
Refill Routing Note   Medication(s) are not appropriate for processing by [facility] for the following reason(s):        Non-participating provider    [facility] action(s):  Route               Appointments  past 12m or future 3m with PCP    Date Provider   Last Visit   11/30/2023 Marcela Ortiz MD   Next Visit   5/2/2024 Marcela Ortiz MD   ED visits in past 90 days: 0        Note composed:2:59 PM 04/30/2024          
Negative

## 2024-05-17 ENCOUNTER — APPOINTMENT (OUTPATIENT)
Dept: UROLOGY | Facility: CLINIC | Age: 69
End: 2024-05-17
Payer: MEDICARE

## 2024-05-17 DIAGNOSIS — I48.91 UNSPECIFIED ATRIAL FIBRILLATION: ICD-10-CM

## 2024-05-17 DIAGNOSIS — N40.1 BENIGN PROSTATIC HYPERPLASIA WITH LOWER URINARY TRACT SYMPMS: ICD-10-CM

## 2024-05-17 DIAGNOSIS — R33.8 OTHER RETENTION OF URINE: ICD-10-CM

## 2024-05-17 DIAGNOSIS — N13.8 BENIGN PROSTATIC HYPERPLASIA WITH LOWER URINARY TRACT SYMPMS: ICD-10-CM

## 2024-05-17 PROCEDURE — 51702 INSERT TEMP BLADDER CATH: CPT

## 2024-05-31 RX ORDER — LEVOFLOXACIN 500 MG/1
500 TABLET, FILM COATED ORAL DAILY
Qty: 7 | Refills: 0 | Status: ACTIVE | COMMUNITY
Start: 2024-05-31 | End: 1900-01-01

## 2024-05-31 RX ORDER — WARFARIN 5 MG/1
5 TABLET ORAL
Qty: 90 | Refills: 0 | Status: DISCONTINUED | COMMUNITY
Start: 2022-11-13 | End: 2024-05-31

## 2024-06-12 ENCOUNTER — OUTPATIENT (OUTPATIENT)
Dept: OUTPATIENT SERVICES | Facility: HOSPITAL | Age: 69
LOS: 1 days | End: 2024-06-12
Payer: MEDICARE

## 2024-06-12 VITALS
DIASTOLIC BLOOD PRESSURE: 77 MMHG | OXYGEN SATURATION: 98 % | SYSTOLIC BLOOD PRESSURE: 131 MMHG | WEIGHT: 177.91 LBS | HEIGHT: 67 IN | HEART RATE: 87 BPM | RESPIRATION RATE: 16 BRPM | TEMPERATURE: 97 F

## 2024-06-12 DIAGNOSIS — N21.0 CALCULUS IN BLADDER: ICD-10-CM

## 2024-06-12 DIAGNOSIS — Z96.0 PRESENCE OF UROGENITAL IMPLANTS: Chronic | ICD-10-CM

## 2024-06-12 DIAGNOSIS — Z01.818 ENCOUNTER FOR OTHER PREPROCEDURAL EXAMINATION: ICD-10-CM

## 2024-06-12 DIAGNOSIS — Z98.890 OTHER SPECIFIED POSTPROCEDURAL STATES: Chronic | ICD-10-CM

## 2024-06-12 LAB
A1C WITH ESTIMATED AVERAGE GLUCOSE RESULT: 6.1 % — HIGH (ref 4–5.6)
ALBUMIN SERPL ELPH-MCNC: 4.4 G/DL — SIGNIFICANT CHANGE UP (ref 3.5–5.2)
ALP SERPL-CCNC: 65 U/L — SIGNIFICANT CHANGE UP (ref 30–115)
ALT FLD-CCNC: 14 U/L — SIGNIFICANT CHANGE UP (ref 0–41)
ANION GAP SERPL CALC-SCNC: 14 MMOL/L — SIGNIFICANT CHANGE UP (ref 7–14)
AST SERPL-CCNC: 16 U/L — SIGNIFICANT CHANGE UP (ref 0–41)
BASOPHILS # BLD AUTO: 0.03 K/UL — SIGNIFICANT CHANGE UP (ref 0–0.2)
BASOPHILS NFR BLD AUTO: 0.3 % — SIGNIFICANT CHANGE UP (ref 0–1)
BILIRUB SERPL-MCNC: 0.2 MG/DL — SIGNIFICANT CHANGE UP (ref 0.2–1.2)
BUN SERPL-MCNC: 21 MG/DL — HIGH (ref 10–20)
CALCIUM SERPL-MCNC: 9.5 MG/DL — SIGNIFICANT CHANGE UP (ref 8.4–10.5)
CHLORIDE SERPL-SCNC: 100 MMOL/L — SIGNIFICANT CHANGE UP (ref 98–110)
CO2 SERPL-SCNC: 26 MMOL/L — SIGNIFICANT CHANGE UP (ref 17–32)
CREAT SERPL-MCNC: 1 MG/DL — SIGNIFICANT CHANGE UP (ref 0.7–1.5)
EGFR: 81 ML/MIN/1.73M2 — SIGNIFICANT CHANGE UP
EOSINOPHIL # BLD AUTO: 0.2 K/UL — SIGNIFICANT CHANGE UP (ref 0–0.7)
EOSINOPHIL NFR BLD AUTO: 2.3 % — SIGNIFICANT CHANGE UP (ref 0–8)
ESTIMATED AVERAGE GLUCOSE: 128 MG/DL — HIGH (ref 68–114)
GLUCOSE SERPL-MCNC: 91 MG/DL — SIGNIFICANT CHANGE UP (ref 70–99)
HCT VFR BLD CALC: 35.2 % — LOW (ref 42–52)
HGB BLD-MCNC: 10.9 G/DL — LOW (ref 14–18)
IMM GRANULOCYTES NFR BLD AUTO: 0.6 % — HIGH (ref 0.1–0.3)
LYMPHOCYTES # BLD AUTO: 1.43 K/UL — SIGNIFICANT CHANGE UP (ref 1.2–3.4)
LYMPHOCYTES # BLD AUTO: 16.2 % — LOW (ref 20.5–51.1)
MCHC RBC-ENTMCNC: 24.8 PG — LOW (ref 27–31)
MCHC RBC-ENTMCNC: 31 G/DL — LOW (ref 32–37)
MCV RBC AUTO: 80.2 FL — SIGNIFICANT CHANGE UP (ref 80–94)
MONOCYTES # BLD AUTO: 0.72 K/UL — HIGH (ref 0.1–0.6)
MONOCYTES NFR BLD AUTO: 8.2 % — SIGNIFICANT CHANGE UP (ref 1.7–9.3)
NEUTROPHILS # BLD AUTO: 6.39 K/UL — SIGNIFICANT CHANGE UP (ref 1.4–6.5)
NEUTROPHILS NFR BLD AUTO: 72.4 % — SIGNIFICANT CHANGE UP (ref 42.2–75.2)
NRBC # BLD: 0 /100 WBCS — SIGNIFICANT CHANGE UP (ref 0–0)
PLATELET # BLD AUTO: 216 K/UL — SIGNIFICANT CHANGE UP (ref 130–400)
PMV BLD: 11 FL — HIGH (ref 7.4–10.4)
POTASSIUM SERPL-MCNC: 4.2 MMOL/L — SIGNIFICANT CHANGE UP (ref 3.5–5)
POTASSIUM SERPL-SCNC: 4.2 MMOL/L — SIGNIFICANT CHANGE UP (ref 3.5–5)
PROT SERPL-MCNC: 7.6 G/DL — SIGNIFICANT CHANGE UP (ref 6–8)
RBC # BLD: 4.39 M/UL — LOW (ref 4.7–6.1)
RBC # FLD: 17.3 % — HIGH (ref 11.5–14.5)
SODIUM SERPL-SCNC: 140 MMOL/L — SIGNIFICANT CHANGE UP (ref 135–146)
WBC # BLD: 8.82 K/UL — SIGNIFICANT CHANGE UP (ref 4.8–10.8)
WBC # FLD AUTO: 8.82 K/UL — SIGNIFICANT CHANGE UP (ref 4.8–10.8)

## 2024-06-12 PROCEDURE — 93010 ELECTROCARDIOGRAM REPORT: CPT

## 2024-06-12 PROCEDURE — 85025 COMPLETE CBC W/AUTO DIFF WBC: CPT

## 2024-06-12 PROCEDURE — 83036 HEMOGLOBIN GLYCOSYLATED A1C: CPT

## 2024-06-12 PROCEDURE — 93005 ELECTROCARDIOGRAM TRACING: CPT

## 2024-06-12 PROCEDURE — 99214 OFFICE O/P EST MOD 30 MIN: CPT | Mod: 25

## 2024-06-12 PROCEDURE — 80053 COMPREHEN METABOLIC PANEL: CPT

## 2024-06-12 PROCEDURE — 36415 COLL VENOUS BLD VENIPUNCTURE: CPT

## 2024-06-12 NOTE — H&P PST ADULT - CRANIAL NERVE
s/p one CVA and possible TIA 4 weeks ago  Alert and Oriented x 3, No nystagmus.  5/5 upper extremities, lower R< L foot  , Sensation intact to light touch x 4 extremities, + facial droop / slurred speech. No pronator drift. No midline cervical/thoracic/lumbar tenderness to palpation or step off. Abnormal gait,

## 2024-06-12 NOTE — H&P PST ADULT - HISTORY OF PRESENT ILLNESS
70 yo male accompanied by his cousin Keanu  ( Health care proxy) PAST MEDICAL & SURGICAL HISTORY: Atrial fibrillation, Overactive bladder, BPH , CVA ,possible TIA-a months ago  Multiple sclerosis, Kidney stone, Suprapubic catheter, History of penile implant, H/O hernia repair presents for PAST in preparation for CYSTOSCOPY LITHOLAPAXY BLADDER STONE  ERP?: NoLaterality: N/ALength of Procedure: 60 Minutes  Anesthesia Type: General  Pt came back to PAST for preparation for secondary bladder stone removal. Currently has a Martinez  in placed ( last time changed about 3 weeks ago).  PATIENT/GUARDIAN CURRENTLY DENIES CHEST PAIN  SHORTNESS OF BREATH  PALPITATIONS,  DYSURIA, OR UPPER RESPIRATORY INFECTION IN PAST 2 WEEKS    Anesthesia Alert  NO--Difficult Airway  NO--History of neck surgery or radiation  NO--Limited ROM of neck  NO--History of Malignant hyperthermia  NO--No personal or family history of Pseudocholinesterase deficiency.  NO--Prior Anesthesia Complication  NO--Latex Allergy  NO--Loose teeth  NO--History of Rheumatoid Arthritis  NO--Bleeding risk  NO--FRANKIE  NO--Other_____    PT/GUARDIAN DENIES ANY RASHES, ABRASION, OR OPEN WOUNDS OR CUTS    AS PER THE PT/GUARDIAN, THIS IS HIS/HER COMPLETE MEDICAL AND SURGICAL HX, INCLUDING MEDICATIONS PRESCRIBED AND OVER THE COUNTER  Patient/guardian understands the instructions and was given the opportunity to ask questions and have them answered.  pt/guardian denies any suicidal ideation or thoughts, pt states not a threat to self or others

## 2024-06-12 NOTE — H&P PST ADULT - REASON FOR ADMISSION
Case Type: OP Block TimeSuite: [facility] Yung [facility] DateTime: 06- - 0:00PAST DateTime: 06- - 11:15Procedure: CYSTOSCOPY LITHOLAPAXY BLADDER STONE  ERP?: NoLaterality: N/ALength of Procedure: 60 Minutes  Anesthesia Type: General

## 2024-06-12 NOTE — H&P PST ADULT - NSICDXPASTMEDICALHX_GEN_ALL_CORE_FT
PAST MEDICAL HISTORY:  Atrial fibrillation     BPH (benign prostatic hyperplasia)     CVA (cerebrovascular accident)     Kidney stone     Multiple sclerosis     Overactive bladder     Prediabetes     Suprapubic catheter

## 2024-06-12 NOTE — H&P PST ADULT - NSWEIGHTCALCTOOLDRUG_GEN_A_CORE
Case: [id number] Date/Time: 11/05/23 1650    Procedure: LAPAROTOMY, EXPLORATORY,  SMALL BOWEL RESECTION    Anesthesia type: General    Pre-op diagnosis: mid gut volvulus, dead bowel    Location: [facility] OR 09 / SURGERY [facility]    Surgeons: Doroteo York D.O.          Relevant Problems   CARDIAC   (positive) Acute deep vein thrombosis (DVT) (HCC)         (positive) CARMITA (acute kidney injury) (HCC)      ENDO   (positive) Hypothyroid       Physical Exam    Airway - unable to assess  Mallampati: III  TM distance: >3 FB  Neck ROM: limited       Cardiovascular - normal exam  Rhythm: regular  Rate: normal  (-) murmur     Dental - normal exam           Pulmonary - normal exam  Breath sounds clear to auscultation     Abdominal    Neurological - normal exam         Other findings: Obtunded, septic, hypotensive, shock            Anesthesia Plan    ASA 5- EMERGENT       Plan - general       Airway plan will be ETT    (Arterial line, central venous catheter)      Induction: intravenous    Postoperative Plan: Postoperative administration of opioids is intended.    Pertinent diagnostic labs and testing reviewed    Informed Consent:    Anesthetic plan and risks discussed with patient.    Use of blood products discussed with: patient whom consented to blood products.         
 used

## 2024-06-13 DIAGNOSIS — N21.0 CALCULUS IN BLADDER: ICD-10-CM

## 2024-06-13 DIAGNOSIS — Z01.818 ENCOUNTER FOR OTHER PREPROCEDURAL EXAMINATION: ICD-10-CM

## 2024-06-26 ENCOUNTER — TRANSCRIPTION ENCOUNTER (OUTPATIENT)
Age: 69
End: 2024-06-26

## 2024-06-26 ENCOUNTER — OUTPATIENT (OUTPATIENT)
Dept: OUTPATIENT SERVICES | Facility: HOSPITAL | Age: 69
LOS: 1 days | Discharge: HOME | End: 2024-06-26
Payer: MEDICARE

## 2024-06-26 ENCOUNTER — APPOINTMENT (OUTPATIENT)
Dept: UROLOGY | Facility: HOSPITAL | Age: 69
End: 2024-06-26

## 2024-06-26 VITALS — RESPIRATION RATE: 18 BRPM | DIASTOLIC BLOOD PRESSURE: 70 MMHG | SYSTOLIC BLOOD PRESSURE: 130 MMHG | HEART RATE: 67 BPM

## 2024-06-26 VITALS
WEIGHT: 177.91 LBS | HEART RATE: 90 BPM | RESPIRATION RATE: 16 BRPM | TEMPERATURE: 97 F | SYSTOLIC BLOOD PRESSURE: 132 MMHG | DIASTOLIC BLOOD PRESSURE: 84 MMHG | HEIGHT: 66 IN | OXYGEN SATURATION: 99 %

## 2024-06-26 DIAGNOSIS — Z96.0 PRESENCE OF UROGENITAL IMPLANTS: Chronic | ICD-10-CM

## 2024-06-26 DIAGNOSIS — Z98.890 OTHER SPECIFIED POSTPROCEDURAL STATES: Chronic | ICD-10-CM

## 2024-06-26 DIAGNOSIS — N21.0 CALCULUS IN BLADDER: ICD-10-CM

## 2024-06-26 LAB — GLUCOSE BLDC GLUCOMTR-MCNC: 98 MG/DL — SIGNIFICANT CHANGE UP (ref 70–99)

## 2024-06-26 PROCEDURE — 82962 GLUCOSE BLOOD TEST: CPT

## 2024-06-26 PROCEDURE — C9399: CPT

## 2024-06-26 PROCEDURE — 52318 REMOVE BLADDER STONE: CPT

## 2024-06-26 PROCEDURE — C1758: CPT

## 2024-06-26 PROCEDURE — C1889: CPT

## 2024-06-26 PROCEDURE — 82365 CALCULUS SPECTROSCOPY: CPT

## 2024-06-26 RX ORDER — LEVOTHYROXINE SODIUM 125 MCG
0 TABLET ORAL
Qty: 0 | Refills: 3 | DISCHARGE

## 2024-06-26 RX ORDER — HYDROMORPHONE HCL 0.2 MG/ML
0.5 INJECTION, SOLUTION INTRAVENOUS
Refills: 0 | Status: DISCONTINUED | OUTPATIENT
Start: 2024-06-26 | End: 2024-06-26

## 2024-06-26 RX ORDER — ROPINIROLE 8 MG/1
0 TABLET, FILM COATED, EXTENDED RELEASE ORAL
Qty: 0 | Refills: 0 | DISCHARGE

## 2024-06-26 RX ORDER — METFORMIN HYDROCHLORIDE 850 MG/1
0 TABLET ORAL
Qty: 0 | Refills: 0 | DISCHARGE

## 2024-06-26 RX ORDER — METOPROLOL TARTRATE 50 MG
0 TABLET ORAL
Qty: 0 | Refills: 0 | DISCHARGE

## 2024-06-26 RX ORDER — OMEPRAZOLE 10 MG/1
0 CAPSULE, DELAYED RELEASE ORAL
Qty: 0 | Refills: 0 | DISCHARGE

## 2024-06-26 RX ORDER — TAMSULOSIN HYDROCHLORIDE 0.4 MG/1
0 CAPSULE ORAL
Qty: 0 | Refills: 0 | DISCHARGE

## 2024-06-26 RX ORDER — SIMVASTATIN 20 MG/1
0 TABLET, FILM COATED ORAL
Qty: 0 | Refills: 0 | DISCHARGE

## 2024-06-26 RX ORDER — TRAZODONE HCL 50 MG
0 TABLET ORAL
Qty: 0 | Refills: 0 | DISCHARGE

## 2024-06-26 RX ORDER — HYDROMORPHONE HCL 0.2 MG/ML
1 INJECTION, SOLUTION INTRAVENOUS
Refills: 0 | Status: DISCONTINUED | OUTPATIENT
Start: 2024-06-26 | End: 2024-06-26

## 2024-06-26 RX ORDER — APIXABAN 2.5 MG/1
0 TABLET, FILM COATED ORAL
Qty: 0 | Refills: 1 | DISCHARGE

## 2024-06-26 RX ORDER — ALENDRONATE SODIUM 70 MG/1
0 TABLET ORAL
Qty: 0 | Refills: 0 | DISCHARGE

## 2024-06-26 RX ORDER — SODIUM CHLORIDE 0.9 % (FLUSH) 0.9 %
1000 SYRINGE (ML) INJECTION
Refills: 0 | Status: DISCONTINUED | OUTPATIENT
Start: 2024-06-26 | End: 2024-06-26

## 2024-06-26 RX ORDER — ONDANSETRON HYDROCHLORIDE 2 MG/ML
4 INJECTION INTRAMUSCULAR; INTRAVENOUS ONCE
Refills: 0 | Status: DISCONTINUED | OUTPATIENT
Start: 2024-06-26 | End: 2024-06-26

## 2024-06-26 RX ADMIN — Medication 100 MILLILITER(S): at 15:04

## 2024-06-27 ENCOUNTER — NON-APPOINTMENT (OUTPATIENT)
Age: 69
End: 2024-06-27

## 2024-06-27 PROBLEM — R73.03 PREDIABETES: Chronic | Status: ACTIVE | Noted: 2024-06-12

## 2024-06-28 ENCOUNTER — APPOINTMENT (OUTPATIENT)
Dept: UROLOGY | Facility: CLINIC | Age: 69
End: 2024-06-28

## 2024-06-28 DIAGNOSIS — E11.9 TYPE 2 DIABETES MELLITUS W/OUT COMPLICATIONS: ICD-10-CM

## 2024-06-28 DIAGNOSIS — G36.0 NEUROMYELITIS OPTICA [DEVIC]: ICD-10-CM

## 2024-06-28 DIAGNOSIS — N21.0 CALCULUS IN BLADDER: ICD-10-CM

## 2024-06-28 PROBLEM — E03.9 HYPOTHYROIDISM, UNSPECIFIED: Chronic | Status: ACTIVE | Noted: 2024-06-26

## 2024-06-28 PROCEDURE — 99024 POSTOP FOLLOW-UP VISIT: CPT

## 2024-06-29 DIAGNOSIS — N40.0 BENIGN PROSTATIC HYPERPLASIA WITHOUT LOWER URINARY TRACT SYMPTOMS: ICD-10-CM

## 2024-06-29 DIAGNOSIS — R73.03 PREDIABETES: ICD-10-CM

## 2024-06-29 DIAGNOSIS — Z79.01 LONG TERM (CURRENT) USE OF ANTICOAGULANTS: ICD-10-CM

## 2024-06-29 DIAGNOSIS — I48.91 UNSPECIFIED ATRIAL FIBRILLATION: ICD-10-CM

## 2024-06-29 DIAGNOSIS — G35 MULTIPLE SCLEROSIS: ICD-10-CM

## 2024-06-29 DIAGNOSIS — N21.0 CALCULUS IN BLADDER: ICD-10-CM

## 2024-06-29 DIAGNOSIS — Z86.73 PERSONAL HISTORY OF TRANSIENT ISCHEMIC ATTACK (TIA), AND CEREBRAL INFARCTION WITHOUT RESIDUAL DEFICITS: ICD-10-CM

## 2024-06-29 DIAGNOSIS — Z79.84 LONG TERM (CURRENT) USE OF ORAL HYPOGLYCEMIC DRUGS: ICD-10-CM

## 2024-07-01 PROBLEM — N21.0 STONE IN BLADDER DIVERTICULUM: Status: ACTIVE | Noted: 2023-10-05

## 2024-07-01 PROBLEM — E11.9 DIABETES: Status: ACTIVE | Noted: 2022-11-22

## 2024-07-01 PROBLEM — G36.0 NEUROMYELITIS OPTICA [DEVIC]: Status: ACTIVE | Noted: 2022-11-22

## 2024-07-03 ENCOUNTER — NON-APPOINTMENT (OUTPATIENT)
Age: 69
End: 2024-07-03

## 2024-07-05 ENCOUNTER — NON-APPOINTMENT (OUTPATIENT)
Age: 69
End: 2024-07-05

## 2024-07-05 ENCOUNTER — APPOINTMENT (OUTPATIENT)
Dept: UROLOGY | Facility: CLINIC | Age: 69
End: 2024-07-05
Payer: MEDICARE

## 2024-07-05 ENCOUNTER — INPATIENT (INPATIENT)
Facility: HOSPITAL | Age: 69
LOS: 5 days | Discharge: HOME | DRG: 812 | End: 2024-07-11
Attending: INTERNAL MEDICINE | Admitting: STUDENT IN AN ORGANIZED HEALTH CARE EDUCATION/TRAINING PROGRAM
Payer: MEDICARE

## 2024-07-05 VITALS
HEART RATE: 112 BPM | DIASTOLIC BLOOD PRESSURE: 60 MMHG | RESPIRATION RATE: 18 BRPM | OXYGEN SATURATION: 99 % | SYSTOLIC BLOOD PRESSURE: 92 MMHG | TEMPERATURE: 97 F | WEIGHT: 177.91 LBS | HEIGHT: 66 IN

## 2024-07-05 DIAGNOSIS — R33.9 RETENTION OF URINE, UNSPECIFIED: ICD-10-CM

## 2024-07-05 DIAGNOSIS — Z98.890 OTHER SPECIFIED POSTPROCEDURAL STATES: Chronic | ICD-10-CM

## 2024-07-05 DIAGNOSIS — R31.0 GROSS HEMATURIA: ICD-10-CM

## 2024-07-05 DIAGNOSIS — D64.9 ANEMIA, UNSPECIFIED: ICD-10-CM

## 2024-07-05 DIAGNOSIS — Z96.0 PRESENCE OF UROGENITAL IMPLANTS: Chronic | ICD-10-CM

## 2024-07-05 LAB
ABO RH CONFIRMATION: SIGNIFICANT CHANGE UP
ALBUMIN SERPL ELPH-MCNC: 3.6 G/DL — SIGNIFICANT CHANGE UP (ref 3.5–5.2)
ALP SERPL-CCNC: 54 U/L — SIGNIFICANT CHANGE UP (ref 30–115)
ALT FLD-CCNC: 10 U/L — SIGNIFICANT CHANGE UP (ref 0–41)
ANION GAP SERPL CALC-SCNC: 13 MMOL/L — SIGNIFICANT CHANGE UP (ref 7–14)
APPEARANCE UR: ABNORMAL
APTT BLD: 30.9 SEC — SIGNIFICANT CHANGE UP (ref 27–39.2)
AST SERPL-CCNC: 17 U/L — SIGNIFICANT CHANGE UP (ref 0–41)
BACTERIA # UR AUTO: ABNORMAL /HPF
BASOPHILS # BLD AUTO: 0.02 K/UL — SIGNIFICANT CHANGE UP (ref 0–0.2)
BASOPHILS # BLD AUTO: 0.02 K/UL — SIGNIFICANT CHANGE UP (ref 0–0.2)
BASOPHILS NFR BLD AUTO: 0.2 % — SIGNIFICANT CHANGE UP (ref 0–1)
BASOPHILS NFR BLD AUTO: 0.2 % — SIGNIFICANT CHANGE UP (ref 0–1)
BILIRUB SERPL-MCNC: 0.3 MG/DL — SIGNIFICANT CHANGE UP (ref 0.2–1.2)
BILIRUB UR-MCNC: ABNORMAL
BLD GP AB SCN SERPL QL: SIGNIFICANT CHANGE UP
BUN SERPL-MCNC: 26 MG/DL — HIGH (ref 10–20)
CALCIUM SERPL-MCNC: 9 MG/DL — SIGNIFICANT CHANGE UP (ref 8.4–10.5)
CHLORIDE SERPL-SCNC: 97 MMOL/L — LOW (ref 98–110)
CO2 SERPL-SCNC: 25 MMOL/L — SIGNIFICANT CHANGE UP (ref 17–32)
COLOR SPEC: ABNORMAL
CREAT SERPL-MCNC: 1.6 MG/DL — HIGH (ref 0.7–1.5)
DIFF PNL FLD: ABNORMAL
EGFR: 46 ML/MIN/1.73M2 — LOW
EOSINOPHIL # BLD AUTO: 0.15 K/UL — SIGNIFICANT CHANGE UP (ref 0–0.7)
EOSINOPHIL # BLD AUTO: 0.21 K/UL — SIGNIFICANT CHANGE UP (ref 0–0.7)
EOSINOPHIL NFR BLD AUTO: 1.2 % — SIGNIFICANT CHANGE UP (ref 0–8)
EOSINOPHIL NFR BLD AUTO: 2.1 % — SIGNIFICANT CHANGE UP (ref 0–8)
EPI CELLS # UR: SIGNIFICANT CHANGE UP
GLUCOSE SERPL-MCNC: 100 MG/DL — HIGH (ref 70–99)
GLUCOSE UR QL: NEGATIVE MG/DL — SIGNIFICANT CHANGE UP
HCT VFR BLD CALC: 22.1 % — LOW (ref 42–52)
HCT VFR BLD CALC: 25.4 % — LOW (ref 42–52)
HGB BLD-MCNC: 7 G/DL — LOW (ref 14–18)
HGB BLD-MCNC: 8.1 G/DL — LOW (ref 14–18)
IMM GRANULOCYTES NFR BLD AUTO: 0.9 % — HIGH (ref 0.1–0.3)
IMM GRANULOCYTES NFR BLD AUTO: 1.1 % — HIGH (ref 0.1–0.3)
INR BLD: 2.06 RATIO — HIGH (ref 0.65–1.3)
KETONES UR-MCNC: NEGATIVE MG/DL — SIGNIFICANT CHANGE UP
LEUKOCYTE ESTERASE UR-ACNC: ABNORMAL
LYMPHOCYTES # BLD AUTO: 1.26 K/UL — SIGNIFICANT CHANGE UP (ref 1.2–3.4)
LYMPHOCYTES # BLD AUTO: 10.3 % — LOW (ref 20.5–51.1)
LYMPHOCYTES # BLD AUTO: 2.17 K/UL — SIGNIFICANT CHANGE UP (ref 1.2–3.4)
LYMPHOCYTES # BLD AUTO: 21.6 % — SIGNIFICANT CHANGE UP (ref 20.5–51.1)
MCHC RBC-ENTMCNC: 24.7 PG — LOW (ref 27–31)
MCHC RBC-ENTMCNC: 24.7 PG — LOW (ref 27–31)
MCHC RBC-ENTMCNC: 31.7 G/DL — LOW (ref 32–37)
MCHC RBC-ENTMCNC: 31.9 G/DL — LOW (ref 32–37)
MCV RBC AUTO: 77.4 FL — LOW (ref 80–94)
MCV RBC AUTO: 78.1 FL — LOW (ref 80–94)
MONOCYTES # BLD AUTO: 0.83 K/UL — HIGH (ref 0.1–0.6)
MONOCYTES # BLD AUTO: 0.99 K/UL — HIGH (ref 0.1–0.6)
MONOCYTES NFR BLD AUTO: 8.1 % — SIGNIFICANT CHANGE UP (ref 1.7–9.3)
MONOCYTES NFR BLD AUTO: 8.3 % — SIGNIFICANT CHANGE UP (ref 1.7–9.3)
NEUTROPHILS # BLD AUTO: 6.72 K/UL — HIGH (ref 1.4–6.5)
NEUTROPHILS # BLD AUTO: 9.64 K/UL — HIGH (ref 1.4–6.5)
NEUTROPHILS NFR BLD AUTO: 66.9 % — SIGNIFICANT CHANGE UP (ref 42.2–75.2)
NEUTROPHILS NFR BLD AUTO: 79.1 % — HIGH (ref 42.2–75.2)
NITRITE UR-MCNC: POSITIVE
NRBC # BLD: 0 /100 WBCS — SIGNIFICANT CHANGE UP (ref 0–0)
NRBC # BLD: 0 /100 WBCS — SIGNIFICANT CHANGE UP (ref 0–0)
PH UR: 5 — SIGNIFICANT CHANGE UP (ref 5–8)
PLATELET # BLD AUTO: 200 K/UL — SIGNIFICANT CHANGE UP (ref 130–400)
PLATELET # BLD AUTO: 216 K/UL — SIGNIFICANT CHANGE UP (ref 130–400)
PMV BLD: 10.9 FL — HIGH (ref 7.4–10.4)
PMV BLD: 11.5 FL — HIGH (ref 7.4–10.4)
POTASSIUM SERPL-MCNC: 4.7 MMOL/L — SIGNIFICANT CHANGE UP (ref 3.5–5)
POTASSIUM SERPL-SCNC: 4.7 MMOL/L — SIGNIFICANT CHANGE UP (ref 3.5–5)
PROT SERPL-MCNC: 6.3 G/DL — SIGNIFICANT CHANGE UP (ref 6–8)
PROT UR-MCNC: 100 MG/DL
PROTHROM AB SERPL-ACNC: 23.7 SEC — HIGH (ref 9.95–12.87)
RBC # BLD: 2.83 M/UL — LOW (ref 4.7–6.1)
RBC # BLD: 3.28 M/UL — LOW (ref 4.7–6.1)
RBC # FLD: 17.4 % — HIGH (ref 11.5–14.5)
RBC # FLD: 17.6 % — HIGH (ref 11.5–14.5)
RBC CASTS # UR COMP ASSIST: ABNORMAL /HPF
SODIUM SERPL-SCNC: 135 MMOL/L — SIGNIFICANT CHANGE UP (ref 135–146)
SP GR SPEC: 1.02 — SIGNIFICANT CHANGE UP (ref 1–1.03)
UROBILINOGEN FLD QL: 0.2 MG/DL — SIGNIFICANT CHANGE UP (ref 0.2–1)
WBC # BLD: 10.04 K/UL — SIGNIFICANT CHANGE UP (ref 4.8–10.8)
WBC # BLD: 12.19 K/UL — HIGH (ref 4.8–10.8)
WBC # FLD AUTO: 10.04 K/UL — SIGNIFICANT CHANGE UP (ref 4.8–10.8)
WBC # FLD AUTO: 12.19 K/UL — HIGH (ref 4.8–10.8)
WBC UR QL: 50 /HPF — HIGH (ref 0–5)

## 2024-07-05 PROCEDURE — 87186 SC STD MICRODIL/AGAR DIL: CPT

## 2024-07-05 PROCEDURE — 82962 GLUCOSE BLOOD TEST: CPT

## 2024-07-05 PROCEDURE — 36415 COLL VENOUS BLD VENIPUNCTURE: CPT

## 2024-07-05 PROCEDURE — 86901 BLOOD TYPING SEROLOGIC RH(D): CPT

## 2024-07-05 PROCEDURE — 97162 PT EVAL MOD COMPLEX 30 MIN: CPT | Mod: GP

## 2024-07-05 PROCEDURE — 87077 CULTURE AEROBIC IDENTIFY: CPT

## 2024-07-05 PROCEDURE — 76770 US EXAM ABDO BACK WALL COMP: CPT | Mod: 26

## 2024-07-05 PROCEDURE — P9016: CPT

## 2024-07-05 PROCEDURE — 99223 1ST HOSP IP/OBS HIGH 75: CPT

## 2024-07-05 PROCEDURE — 87040 BLOOD CULTURE FOR BACTERIA: CPT

## 2024-07-05 PROCEDURE — 85610 PROTHROMBIN TIME: CPT

## 2024-07-05 PROCEDURE — 87086 URINE CULTURE/COLONY COUNT: CPT

## 2024-07-05 PROCEDURE — 80048 BASIC METABOLIC PNL TOTAL CA: CPT

## 2024-07-05 PROCEDURE — 99284 EMERGENCY DEPT VISIT MOD MDM: CPT

## 2024-07-05 PROCEDURE — 86900 BLOOD TYPING SEROLOGIC ABO: CPT

## 2024-07-05 PROCEDURE — 97116 GAIT TRAINING THERAPY: CPT | Mod: GP

## 2024-07-05 PROCEDURE — 81001 URINALYSIS AUTO W/SCOPE: CPT

## 2024-07-05 PROCEDURE — P9040: CPT

## 2024-07-05 PROCEDURE — 36430 TRANSFUSION BLD/BLD COMPNT: CPT

## 2024-07-05 PROCEDURE — 99285 EMERGENCY DEPT VISIT HI MDM: CPT

## 2024-07-05 PROCEDURE — 83735 ASSAY OF MAGNESIUM: CPT

## 2024-07-05 PROCEDURE — 85730 THROMBOPLASTIN TIME PARTIAL: CPT

## 2024-07-05 PROCEDURE — 99213 OFFICE O/P EST LOW 20 MIN: CPT

## 2024-07-05 PROCEDURE — 86850 RBC ANTIBODY SCREEN: CPT

## 2024-07-05 PROCEDURE — 97110 THERAPEUTIC EXERCISES: CPT | Mod: GP

## 2024-07-05 PROCEDURE — 85025 COMPLETE CBC W/AUTO DIFF WBC: CPT

## 2024-07-05 PROCEDURE — 80053 COMPREHEN METABOLIC PANEL: CPT

## 2024-07-05 RX ORDER — ALENDRONATE SODIUM 35 MG/1
1 TABLET ORAL
Refills: 0 | DISCHARGE

## 2024-07-05 RX ORDER — FUROSEMIDE 10 MG/ML
1 INJECTION, SOLUTION INTRAMUSCULAR; INTRAVENOUS
Refills: 0 | DISCHARGE

## 2024-07-05 RX ORDER — ONDANSETRON HYDROCHLORIDE 2 MG/ML
4 INJECTION INTRAMUSCULAR; INTRAVENOUS EVERY 8 HOURS
Refills: 0 | Status: DISCONTINUED | OUTPATIENT
Start: 2024-07-05 | End: 2024-07-11

## 2024-07-05 RX ORDER — METOPROLOL TARTRATE 50 MG
50 TABLET ORAL DAILY
Refills: 0 | Status: DISCONTINUED | OUTPATIENT
Start: 2024-07-05 | End: 2024-07-06

## 2024-07-05 RX ORDER — METFORMIN HYDROCHLORIDE 850 MG/1
1 TABLET, FILM COATED ORAL
Refills: 0 | DISCHARGE

## 2024-07-05 RX ORDER — SIMVASTATIN 40 MG
20 TABLET ORAL AT BEDTIME
Refills: 0 | Status: DISCONTINUED | OUTPATIENT
Start: 2024-07-05 | End: 2024-07-11

## 2024-07-05 RX ORDER — TRAZODONE HYDROCHLORIDE 50 MG/1
1 TABLET, FILM COATED ORAL
Refills: 0 | DISCHARGE

## 2024-07-05 RX ORDER — DEXTROSE MONOHYDRATE AND SODIUM CHLORIDE 5; .3 G/100ML; G/100ML
1000 INJECTION, SOLUTION INTRAVENOUS
Refills: 0 | Status: DISCONTINUED | OUTPATIENT
Start: 2024-07-05 | End: 2024-07-08

## 2024-07-05 RX ORDER — TRAZODONE HYDROCHLORIDE 50 MG/1
100 TABLET, FILM COATED ORAL DAILY
Refills: 0 | Status: DISCONTINUED | OUTPATIENT
Start: 2024-07-05 | End: 2024-07-07

## 2024-07-05 RX ORDER — INSULIN LISPRO 100 [IU]/ML
INJECTION, SOLUTION SUBCUTANEOUS
Refills: 0 | Status: DISCONTINUED | OUTPATIENT
Start: 2024-07-05 | End: 2024-07-11

## 2024-07-05 RX ORDER — SODIUM CHLORIDE 0.9 % (FLUSH) 0.9 %
1000 SYRINGE (ML) INJECTION ONCE
Refills: 0 | Status: COMPLETED | OUTPATIENT
Start: 2024-07-05 | End: 2024-07-05

## 2024-07-05 RX ORDER — PANTOPRAZOLE SODIUM 40 MG/10ML
40 INJECTION, POWDER, FOR SOLUTION INTRAVENOUS
Refills: 0 | Status: DISCONTINUED | OUTPATIENT
Start: 2024-07-05 | End: 2024-07-11

## 2024-07-05 RX ORDER — LEVOTHYROXINE SODIUM 25 MCG
112 TABLET ORAL DAILY
Refills: 0 | Status: DISCONTINUED | OUTPATIENT
Start: 2024-07-05 | End: 2024-07-11

## 2024-07-05 RX ORDER — ROPINIROLE HYDROCHLORIDE 0.5 MG/1
0.5 TABLET, FILM COATED ORAL
Refills: 0 | Status: DISCONTINUED | OUTPATIENT
Start: 2024-07-05 | End: 2024-07-11

## 2024-07-05 RX ORDER — ROPINIROLE HYDROCHLORIDE 0.5 MG/1
1 TABLET, FILM COATED ORAL
Refills: 0 | DISCHARGE

## 2024-07-05 RX ORDER — OMEPRAZOLE 10 MG/1
1 CAPSULE, DELAYED RELEASE ORAL
Refills: 0 | DISCHARGE

## 2024-07-05 RX ORDER — ACETAMINOPHEN 325 MG
650 TABLET ORAL EVERY 6 HOURS
Refills: 0 | Status: DISCONTINUED | OUTPATIENT
Start: 2024-07-05 | End: 2024-07-11

## 2024-07-05 RX ORDER — DEXTROSE 30 % IN WATER 30 %
15 VIAL (ML) INTRAVENOUS ONCE
Refills: 0 | Status: DISCONTINUED | OUTPATIENT
Start: 2024-07-05 | End: 2024-07-11

## 2024-07-05 RX ADMIN — Medication 3 MILLIGRAM(S): at 22:28

## 2024-07-05 RX ADMIN — Medication 1000 MILLILITER(S): at 13:29

## 2024-07-05 RX ADMIN — Medication 20 MILLIGRAM(S): at 22:28

## 2024-07-05 RX ADMIN — ROPINIROLE HYDROCHLORIDE 0.5 MILLIGRAM(S): 0.5 TABLET, FILM COATED ORAL at 22:28

## 2024-07-05 NOTE — ED PROVIDER NOTE - NSICDXPASTSURGICALHX_GEN_ALL_CORE_FT
PAST SURGICAL HISTORY:  H/O hernia repair abdominal with mesh    History of penile implant     History of suprapubic catheter

## 2024-07-05 NOTE — ED PROVIDER NOTE - NSICDXPASTMEDICALHX_GEN_ALL_CORE_FT
PAST MEDICAL HISTORY:  Atrial fibrillation     BPH (benign prostatic hyperplasia)     CVA (cerebrovascular accident)     Hypothyroid     Kidney stone     Multiple sclerosis     Overactive bladder     Prediabetes     Suprapubic catheter

## 2024-07-05 NOTE — H&P ADULT - NSHPLABSRESULTS_GEN_ALL_CORE
8.1    12.19 )-----------( 216      ( 05 Jul 2024 13:40 )             25.4       07-05    135  |  97<L>  |  26<H>  ----------------------------<  100<H>  4.7   |  25  |  1.6<H>    Ca    9.0      05 Jul 2024 13:40    TPro  6.3  /  Alb  3.6  /  TBili  0.3  /  DBili  x   /  AST  17  /  ALT  10  /  AlkPhos  54  07-05          Urinalysis Basic - ( 05 Jul 2024 13:40 )  Color: x / Appearance: x / SG: x / pH: x  Gluc: 100 mg/dL / Ketone: x  / Bili: x / Urobili: x   Blood: x / Protein: x / Nitrite: x   Leuk Esterase: x / RBC: x / WBC x   Sq Epi: x / Non Sq Epi: x / Bacteria: x    PT/INR - ( 05 Jul 2024 13:40 )   PT: 23.70 sec;   INR: 2.06 ratio    PTT - ( 05 Jul 2024 13:40 )  PTT:30.9 sec    Lactate Trend      CAPILLARY BLOOD GLUCOSE      ACC: [id number] EXAM:  US KIDNEYS AND BLADDER   ORDERED BY: BRUNO BETTS     PROCEDURE DATE:  07/05/2024          INTERPRETATION:  CLINICAL INFORMATION: Gross hematuria    COMPARISON: CT of abdomen pelvis dated 3/24/2023    TECHNIQUE: Sonography of the kidneys and bladder.    FINDINGS:  Right kidney: 10.2 cm. Moderate hydronephrosis    Left kidney: 12.6 cm. Moderate hydronephrosis.    Urinary bladder: Large heterogeneous structure within the bladder   measuring 11.2 x 8.4 x 8.1 cm with possible vascular flow. Differential   includes hemorrhagic clots and/or bladder neoplasm. Further evaluation is   recommended to exclude the possibility of an underlying bladder neoplasm.   Bladder volume is 807 cc    Prostate volume 65 cc.    IMPRESSION:  Bilateral moderate hydronephrosis    Large heterogeneous structure within the bladder measuring 11.2 x 8.4 x 8.1 cm with possible vascular flow.    Differential includes hemorrhagic clots and/or bladder neoplasm.    Further evaluation is recommended to exclude the possibility of an underlying bladder neoplasm.    Bladder volume is 807 cc    --- End of Report ---    AMINATA MOORE MD; Resident Radiologist  This document has been electronically signed.  DANTE MUÑOZ MD; Attending Radiologist  This document has been electronically signed. Jul 5 2024  4:40PM 8.1    12.19 )-----------( 216      ( 05 Jul 2024 13:40 )             25.4       07-05    135  |  97<L>  |  26<H>  ----------------------------<  100<H>  4.7   |  25  |  1.6<H>    Ca    9.0      05 Jul 2024 13:40    TPro  6.3  /  Alb  3.6  /  TBili  0.3  /  DBili  x   /  AST  17  /  ALT  10  /  AlkPhos  54  07-05          Urinalysis Basic - ( 05 Jul 2024 13:40 )  Color: x / Appearance: x / SG: x / pH: x  Gluc: 100 mg/dL / Ketone: x  / Bili: x / Urobili: x   Blood: x / Protein: x / Nitrite: x   Leuk Esterase: x / RBC: x / WBC x   Sq Epi: x / Non Sq Epi: x / Bacteria: x    PT/INR - ( 05 Jul 2024 13:40 )   PT: 23.70 sec;   INR: 2.06 ratio    PTT - ( 05 Jul 2024 13:40 )  PTT:30.9 sec      ACC: [id number] EXAM:  US KIDNEYS AND BLADDER   ORDERED BY: BRUNO BETTS     PROCEDURE DATE:  07/05/2024          INTERPRETATION:  CLINICAL INFORMATION: Gross hematuria    COMPARISON: CT of abdomen pelvis dated 3/24/2023    TECHNIQUE: Sonography of the kidneys and bladder.    FINDINGS:  Right kidney: 10.2 cm. Moderate hydronephrosis    Left kidney: 12.6 cm. Moderate hydronephrosis.    Urinary bladder: Large heterogeneous structure within the bladder   measuring 11.2 x 8.4 x 8.1 cm with possible vascular flow. Differential   includes hemorrhagic clots and/or bladder neoplasm. Further evaluation is   recommended to exclude the possibility of an underlying bladder neoplasm.   Bladder volume is 807 cc    Prostate volume 65 cc.    IMPRESSION:  Bilateral moderate hydronephrosis    Large heterogeneous structure within the bladder measuring 11.2 x 8.4 x 8.1 cm with possible vascular flow.    Differential includes hemorrhagic clots and/or bladder neoplasm.    Further evaluation is recommended to exclude the possibility of an underlying bladder neoplasm.    Bladder volume is 807 cc    --- End of Report ---    AMINATA MOORE MD; Resident Radiologist  This document has been electronically signed.  DANTE MUÑOZ MD; Attending Radiologist  This document has been electronically signed. Jul 5 2024  4:40PM

## 2024-07-05 NOTE — CONSULT NOTE ADULT - ASSESSMENT
69 y.o M w/ PMHx of Afib on eliquis, Overactive bladder, BPH, CVA with right sided weakness, Multiple sclerosis, Kidney stone, Suprapubic catheter, History of penile implant, H/O hernia repair presents to the ED for evaluation of gross hematuria x 1 week and increased weakness that he noticed this am. Patient s/p cystolithopaxy POD#9. States that he has been having hematuria since the procedure. He had 1 episode of vomiting this am, c/o back pain which is chronic. Denies abdominal pain, nausea, headache, dysuria, CP, SOB.     Plan:  - NPO/ IVF  - monitor urine output   - renal bladder sono  - consult cardio   - DVT PPX  - will follow  69 y.o M w/ PMHx of Afib on eliquis, Overactive bladder, BPH, CVA with right sided weakness, Multiple sclerosis, Kidney stone, Suprapubic catheter, History of penile implant, H/O hernia repair presents to the ED for evaluation of gross hematuria x 1 week and increased weakness that he noticed this am. Patient s/p cystolithopaxy POD#9. States that he has been having hematuria since the procedure. He had 1 episode of vomiting this am, c/o back pain which is chronic. Denies abdominal pain, nausea, headache, dysuria, CP, SOB.       # HEmaturia 2/2 Eliquis and recent cysto   # anemia   Plan:  - IVF  - monitor urine output   - renal bladder sono reviewed : large clot in bladder  - consult cardio for DOAC   - will need bladder irrigation and poss CBI   - trend cbc   - will follow    d/w Dr. Martin

## 2024-07-05 NOTE — CHART NOTE - NSCHARTNOTEFT_GEN_A_CORE
d/w dr bahena     place 14F calvert in supra-pubic opening     placed 26 F with 5 cc balloon in penis     irrigated bladder with 500 cc sterile water and removed numerous clots     left calvert to gravity from penis and clamped calvert from SPC     - monitor for clot retention   - may need CBI   - flush with 100 cc q6 hours by rn staff     above relayed to ER staff d/w dr bahena     place 14F calvert in supra-pubic opening     placed 26 F with 5 cc balloon in penis     irrigated bladder with 500 cc sterile water and removed numerous clots     left calvert to gravity from penis and clamped calvert from SPC     # hematuria   # elevated INR in pt taking DOAC   # DANIEL     - monitor for clot retention   - may need CBI   - flush with 100 cc q6 hours by rn staff     above relayed to ER staff

## 2024-07-05 NOTE — ED ADULT NURSE NOTE - NSFALLRISKASMTTYPE_ED_ALL_ED
12/09/22     Codeine    Medication list was reviewed by the clinical pharmacist and updated to reflect the patient's current medications.  Was the patient contacted to verify the current medications list? YES  Was the patient preferred pharmacy contacted to verify current medications list? NO      Current Outpatient Medications:     ibuprofen (MOTRIN) 800 MG Tab, Take 800 mg by mouth., Disp: , Rfl:     NON SPECIFIED, focust factor supplement, Disp: , Rfl:     dimenhyDRINATE (DRAMAMINE PO), Take  by mouth., Disp: , Rfl:     losartan (COZAAR) 100 MG Tab, TAKE ONE TABLET BY MOUTH DAILY, Disp: 90 Tab, Rfl: 2    ALOE VERA JUICE PO, Take  by mouth., Disp: , Rfl:     aspirin (ASA) 325 MG TABS, Take 325 mg by mouth every day., Disp: , Rfl:     Based on the pharmacist medication review, the following changes in the patient's medications are recommended based on plans preferred Tiers.     Formulary Mismatch,   Dramamine (vertigo) - preferred drug option is meclizine (tier 2) but pt says she usually gets OTC.        Anuel Arroyo, PharmD      
Initial (On Arrival)

## 2024-07-05 NOTE — ED PROVIDER NOTE - ATTENDING APP SHARED VISIT CONTRIBUTION OF CARE
Patient sent to ED for hematuria.  Vital signs noted.  Bladder seems distended.  He has a suprapubic catheter.  Sonogram shows likely clot.  Urology consulted.  Urology team replaced the suprapubic catheter and placed a Martinez catheter and irrigated the patient with success.  Renal function is acceptable.  Patient will be admitted for serial examinations to assess the need for CBI and possible operative intervention if bleeding persist.

## 2024-07-05 NOTE — H&P ADULT - ASSESSMENT
This is a 70 y/o male with hx of atrial fibrillation (on Eliquis), overactive bladder, BPH , CVA with right sided weakness, multiple sclerosis, kidney stone, suprapubic catheter, history of penile implant, h/o hernia repair presents to the ED from Dr. Cedeno office for evaluation of gross hematuria for 9 days and increased weakness that he noticed this am. Patient s/p cystolithopaxy POD#9. States that he has been having hematuria since the procedure. He had 1 episode of vomiting this am, c/o back pain which is chronic. Denies fevers, chills abdominal pain, back pain, nausea, headache, dysuria, CP, SOB.     #Symptomatic Anemia/ Gross Hematuria 2/2 Eliquis and recent cysto   -Admit to medicine   -Renal bladder sono: Large clot in bladder  -IVF  -Cardiology consult   -Urology recs: Monitor for clot retention, possible CBI, bladder irrigation- flush with 100 cc q6 hours  -Trend cbc     #Hypothyroidism  -c/w synthroid     #DM2  -ISS while inpatient              This is a 68 y/o male with hx of atrial fibrillation (on Eliquis), overactive bladder, BPH, CVA with right sided weakness, multiple sclerosis, kidney stone, suprapubic catheter, history of penile implant, h/o hernia repair presents to the ED from Dr. Cedeno office for evaluation of gross hematuria for 9 days and increased weakness that he noticed this am. Patient s/p cystolithopaxy POD#9. States that he has been having hematuria since the procedure. He had 1 episode of vomiting this am, c/o back pain which is chronic. Denies fevers, chills abdominal pain, back pain, nausea, headache, dysuria, CP, SOB.     #Symptomatic Anemia/ Gross Hematuria 2/2 Eliquis and recent cysto   -Admit to medicine   -s/p 1 u PRBC in ED - f/u post transfusion cbc   -trend H&H  -Urology recs: Monitor for clot retention, possible CBI, bladder irrigation- flush with 100 cc q6 hours  -Holding home eliquis     #DANIEL  -IVF  -Holding home lasix     #BPH  -C/w tamsulosin    #A fib   -Hold Eliquis in setting of symptomatic anemia   -c/w metoprolol     #HLD  -c/w statin    #Hypothyroidism  -c/w synthroid     #DM2  -ISS while inpatient   -Holding home metformin    DVT ppx: SCDs only     Above plan discussed with               This is a 68 y/o male with hx of atrial fibrillation (on Eliquis), overactive bladder, BPH, CVA with right sided weakness, multiple sclerosis, kidney stone, suprapubic catheter, history of penile implant, h/o hernia repair presents to the ED from Dr. Cedeno office for evaluation of gross hematuria for 9 days and increased weakness that he noticed this am. Patient s/p cystolithopaxy POD#9. States that he has been having hematuria since the procedure. He had 1 episode of vomiting this am, c/o back pain which is chronic. Denies fevers, chills abdominal pain, back pain, nausea, headache, dysuria, CP, SOB.     #Gross Hematuria sp cysto ~ 1 week ago   # Symptomatic anemia sp 1 unit prbc in ED   # H/O Chronic Urinary retention sp Chronic SPC  - RBUS noted w/ clots sp irrigation with urology   - Urology recs: Monitor for clot retention, possible CBI, bladder irrigation- flush with 100 cc q6 hours  - HOLD Eliquis   - fu ua/urine clx sent in ED  - Active type and screen    #DANIEL  post obstructive  - rbus w/ clots and > 800 urine - sp SPC exchange and calvert placement in ED  - IVF  - hold lasix     #H/O BPH -C/w tamsulosin    #H/O Chronic A fib - rate controlled now   -Hold Eliquis in setting of symptomatic anemia   -c/w metoprolol     #H/O HLD -c/w statin    #H/O Hypothyroidism-c/w synthroid     #H/O DM2 -I hold home po medications ; insulin inpatient; carb diet

## 2024-07-05 NOTE — ED PROVIDER NOTE - OBJECTIVE STATEMENT
68 y/o male with hx of Atrial fibrillation on eliquis, Overactive bladder, BPH , CVA with right sided weakness,  Multiple sclerosis, Kidney stone, Suprapubic catheter, History of penile implant, H/O hernia repair presents to the ED from Dr. Cedeno office for evaluation of gross hematuria x 1 week and increased weakness. patient s/p cystolithopaxy one week earlier. no abdominal pain . admits to back pain , denies any fevers, chest pain or sob.

## 2024-07-05 NOTE — H&P ADULT - HISTORY OF PRESENT ILLNESS
This is a 68 y/o male with hx of atrial fibrillation (on Eliquis), overactive bladder, BPH , CVA with right sided weakness, multiple sclerosis, kidney stone, suprapubic catheter, history of penile implant, h/o hernia repair presents to the ED from Dr. Cedeno office for evaluation of gross hematuria for 9 days and increased weakness that he noticed this am. Patient s/p cystolithopaxy POD#9. States that he has been having hematuria since the procedure. He had 1 episode of vomiting this am, c/o back pain which is chronic. Denies fevers, chills abdominal pain, back pain, nausea, headache, dysuria, CP, SOB.  This is a 68 y/o male with hx of atrial fibrillation (on Eliquis), overactive bladder, BPH , CVA with right sided weakness, multiple sclerosis, kidney stone, suprapubic catheter, history of penile implant, h/o hernia repair presents to the ED from Dr. Cedeno office for evaluation of gross hematuria for 9 days and increased weakness that he noticed this am. Patient s/p cystolithopaxy POD#9. States that he has been having hematuria since the procedure. He had 1 episode of vomiting this am, c/o back pain which is chronic. Denies fevers, chills abdominal pain,  nausea, headache, dysuria, CP, SOB.

## 2024-07-05 NOTE — CHART NOTE - NSCHARTNOTEFT_GEN_A_CORE
Called for HGB of 7 but this was apparently obtained before blood transfusion was started. Await repeat post transfusion

## 2024-07-05 NOTE — ED PROVIDER NOTE - PHYSICAL EXAMINATION
general: well appearing, no distress  eyes: pallor conjunctiva  ent: right facial droop   cardiac: no murmurs, extrasystole, regular rate, regular rhythm  resp: clear throughout all lung fields, no respiratory distress  abdomen: no CVA tenderness, BS x 4, non tender, no distention, no rashes, no rebound or guarding, suprapubic catheter in place drainage gross hematuria into leg bag  msk: pelvis stable, gait steady  skin: no rashes, swelling, bruising

## 2024-07-05 NOTE — CONSULT NOTE ADULT - NS ATTEND AMEND GEN_ALL_CORE FT
Pt seen and examined at bedside with PA in the ED.   Calvert noted to be completely out of his suprapubic tract with balloon completely deflated.   RBUS reviewed and interpreted. Significant bilateral moderate hydronephrosis. Large clot measuring up to 11cm in his distended bladder. Prostatomegaly noted.   16Fr calvert was placed in his SPT site and a 26Fr calvert was placed in his urethra to help facilitate evacuation of patient's significant clot burden. Over 1L of clot was evacuated. CBI to be initiated as needed with inflow through patient's SPT and outflow to his urethral catheter.   Manual irrigation as needed.   F/u repeat bladder US to confirm evacuation of clot burden and improvement of hydro in 48hrs.   Trend h/h, maintain active TS.  Trend Cr.   Urethral calvert will only be temporary until patient's clot burden is completely evacuted given his severe urethral erosion from having a chronic catheter previously.   F/u UCx. Treat empirically.

## 2024-07-05 NOTE — CONSULT NOTE ADULT - SUBJECTIVE AND OBJECTIVE BOX
69 y.o M w/ PMHx of Afib on eliquis, Overactive bladder, BPH, CVA with right sided weakness, Multiple sclerosis, Kidney stone, Suprapubic catheter, History of penile implant, H/O hernia repair presents to the ED for evaluation of gross hematuria x 1 week and increased weakness that he noticed this am. Patient s/p cystolithopaxy POD#9. States that he has been having hematuria since the procedure. He had 1 episode of vomiting this am, c/o back pain which is chronic. Denies abdominal pain, nausea, headache, dysuria, CP, SOB.       PAST MEDICAL & SURGICAL HISTORY:  Atrial fibrillation  Overactive bladder  BPH (benign prostatic hyperplasia)  CVA (cerebrovascular accident)  Multiple sclerosis  Kidney stone  Suprapubic catheter  Prediabetes  Hypothyroid  History of penile implant  History of suprapubic catheter    H/O hernia repair  abdominal with mesh      MEDICATIONS  (STANDING):   metoprolol suc 100 mg ER tab  omeprazol Rx 20mg cap   levothyroxine 112 mcg tab  trazadone 100 mg tab  simvastatin 20 mg tab  metformin 500 mg tab  alendronate 70 mg tab  tamsulosin 0.4mg cap   ropinirole 0.5mg tab  eliquis 5 mg tab    Allergies  No Known Allergies    Physical Exam:  General: NAD  Pulmonary: CTA B/L  Cardiovascular: S1, S2 w/ RRR  Abdominal: soft, ND/NT  Neuro: A/O x 3    Vital Signs Last 24 Hrs  T(C): 36.3 (05 Jul 2024 12:37), Max: 36.3 (05 Jul 2024 12:37)  T(F): 97.3 (05 Jul 2024 12:37), Max: 97.3 (05 Jul 2024 12:37)  HR: 72 (05 Jul 2024 14:23) (72 - 112)  BP: 114/77 (05 Jul 2024 14:23) (92/60 - 114/77)  RR: 16 (05 Jul 2024 14:23) (16 - 18)  SpO2: 100% (05 Jul 2024 14:23) (99% - 100%)    Parameters below as of 05 Jul 2024 14:23  Patient On (Oxygen Delivery Method): room air    LABS:                        8.1    12.19 )-----------( 216      ( 05 Jul 2024 13:40 )             25.4     PT/INR - ( 05 Jul 2024 13:40 )   PT: 23.70 sec;   INR: 2.06 ratio      PTT - ( 05 Jul 2024 13:40 )  PTT:30.9 sec

## 2024-07-05 NOTE — H&P ADULT - NSHPPHYSICALEXAM_GEN_ALL_CORE
T(C): 37 (07-05-24 @ 15:34), Max: 37 (07-05-24 @ 15:34)  HR: 74 (07-05-24 @ 15:34) (72 - 112)  BP: 143/74 (07-05-24 @ 15:34) (92/60 - 143/74)  RR: 18 (07-05-24 @ 15:36) (16 - 20)  SpO2: 95% (07-05-24 @ 15:36) (88% - 100%)    PHYSICAL EXAM:  GENERAL: NAD  HEAD:  Atraumatic, Normocephalic  EYES: EOMI, PERRLA, conjunctiva and sclera clear  NECK: Supple  CHEST/LUNG: Clear to auscultation bilaterally; No rales, rhonchi or wheezing  HEART: S1,S2 Regular rate and rhythm; No murmurs, rubs, or gallops  ABDOMEN: Soft, nontender, nondistended, no rebound tenderness  : No suprapubic tenderness. 14F SBT with hematuria, indwelling 26 F to gravity  EXTREMITIES:  2+ peripheral pulses bilaterally and symmetrically. No peripheral edema or calf tenderness bilaterally   NEUROLOGY: non-focal, muscle strength 5/5 all extremities, AAOx3

## 2024-07-05 NOTE — H&P ADULT - NS ATTEND AMEND GEN_ALL_CORE FT
pt seen and examined on day of admission 7/5  plan of care discussed with pt and support staff  changes to plan made above as necessary

## 2024-07-05 NOTE — PATIENT PROFILE ADULT - FALL HARM RISK - RISK INTERVENTIONS

## 2024-07-06 DIAGNOSIS — R31.9 HEMATURIA, UNSPECIFIED: ICD-10-CM

## 2024-07-06 LAB
ANION GAP SERPL CALC-SCNC: 9 MMOL/L — SIGNIFICANT CHANGE UP (ref 7–14)
APTT BLD: 31.1 SEC — SIGNIFICANT CHANGE UP (ref 27–39.2)
BASOPHILS # BLD AUTO: 0.02 K/UL — SIGNIFICANT CHANGE UP (ref 0–0.2)
BASOPHILS # BLD AUTO: 0.03 K/UL — SIGNIFICANT CHANGE UP (ref 0–0.2)
BASOPHILS # BLD AUTO: 0.03 K/UL — SIGNIFICANT CHANGE UP (ref 0–0.2)
BASOPHILS NFR BLD AUTO: 0.2 % — SIGNIFICANT CHANGE UP (ref 0–1)
BASOPHILS NFR BLD AUTO: 0.3 % — SIGNIFICANT CHANGE UP (ref 0–1)
BASOPHILS NFR BLD AUTO: 0.3 % — SIGNIFICANT CHANGE UP (ref 0–1)
BUN SERPL-MCNC: 17 MG/DL — SIGNIFICANT CHANGE UP (ref 10–20)
CALCIUM SERPL-MCNC: 8.5 MG/DL — SIGNIFICANT CHANGE UP (ref 8.4–10.5)
CHLORIDE SERPL-SCNC: 107 MMOL/L — SIGNIFICANT CHANGE UP (ref 98–110)
CO2 SERPL-SCNC: 26 MMOL/L — SIGNIFICANT CHANGE UP (ref 17–32)
CREAT SERPL-MCNC: 1.2 MG/DL — SIGNIFICANT CHANGE UP (ref 0.7–1.5)
EGFR: 65 ML/MIN/1.73M2 — SIGNIFICANT CHANGE UP
EOSINOPHIL # BLD AUTO: 0.22 K/UL — SIGNIFICANT CHANGE UP (ref 0–0.7)
EOSINOPHIL # BLD AUTO: 0.23 K/UL — SIGNIFICANT CHANGE UP (ref 0–0.7)
EOSINOPHIL # BLD AUTO: 0.26 K/UL — SIGNIFICANT CHANGE UP (ref 0–0.7)
EOSINOPHIL NFR BLD AUTO: 2.2 % — SIGNIFICANT CHANGE UP (ref 0–8)
EOSINOPHIL NFR BLD AUTO: 2.3 % — SIGNIFICANT CHANGE UP (ref 0–8)
EOSINOPHIL NFR BLD AUTO: 2.5 % — SIGNIFICANT CHANGE UP (ref 0–8)
GLUCOSE BLDC GLUCOMTR-MCNC: 115 MG/DL — HIGH (ref 70–99)
GLUCOSE BLDC GLUCOMTR-MCNC: 125 MG/DL — HIGH (ref 70–99)
GLUCOSE BLDC GLUCOMTR-MCNC: 131 MG/DL — HIGH (ref 70–99)
GLUCOSE BLDC GLUCOMTR-MCNC: 160 MG/DL — HIGH (ref 70–99)
GLUCOSE SERPL-MCNC: 97 MG/DL — SIGNIFICANT CHANGE UP (ref 70–99)
HCT VFR BLD CALC: 24.9 % — LOW (ref 42–52)
HCT VFR BLD CALC: 26 % — LOW (ref 42–52)
HCT VFR BLD CALC: 28 % — LOW (ref 42–52)
HGB BLD-MCNC: 8.1 G/DL — LOW (ref 14–18)
HGB BLD-MCNC: 8.5 G/DL — LOW (ref 14–18)
HGB BLD-MCNC: 9.1 G/DL — LOW (ref 14–18)
IMM GRANULOCYTES NFR BLD AUTO: 0.6 % — HIGH (ref 0.1–0.3)
IMM GRANULOCYTES NFR BLD AUTO: 0.6 % — HIGH (ref 0.1–0.3)
IMM GRANULOCYTES NFR BLD AUTO: 0.9 % — HIGH (ref 0.1–0.3)
INR BLD: 1.59 RATIO — HIGH (ref 0.65–1.3)
LYMPHOCYTES # BLD AUTO: 1.44 K/UL — SIGNIFICANT CHANGE UP (ref 1.2–3.4)
LYMPHOCYTES # BLD AUTO: 1.59 K/UL — SIGNIFICANT CHANGE UP (ref 1.2–3.4)
LYMPHOCYTES # BLD AUTO: 1.77 K/UL — SIGNIFICANT CHANGE UP (ref 1.2–3.4)
LYMPHOCYTES # BLD AUTO: 15.4 % — LOW (ref 20.5–51.1)
LYMPHOCYTES # BLD AUTO: 15.5 % — LOW (ref 20.5–51.1)
LYMPHOCYTES # BLD AUTO: 17.1 % — LOW (ref 20.5–51.1)
MAGNESIUM SERPL-MCNC: 2.1 MG/DL — SIGNIFICANT CHANGE UP (ref 1.8–2.4)
MCHC RBC-ENTMCNC: 25.8 PG — LOW (ref 27–31)
MCHC RBC-ENTMCNC: 25.9 PG — LOW (ref 27–31)
MCHC RBC-ENTMCNC: 26 PG — LOW (ref 27–31)
MCHC RBC-ENTMCNC: 32.5 G/DL — SIGNIFICANT CHANGE UP (ref 32–37)
MCHC RBC-ENTMCNC: 32.5 G/DL — SIGNIFICANT CHANGE UP (ref 32–37)
MCHC RBC-ENTMCNC: 32.7 G/DL — SIGNIFICANT CHANGE UP (ref 32–37)
MCV RBC AUTO: 79.3 FL — LOW (ref 80–94)
MCV RBC AUTO: 79.3 FL — LOW (ref 80–94)
MCV RBC AUTO: 80 FL — SIGNIFICANT CHANGE UP (ref 80–94)
MONOCYTES # BLD AUTO: 0.94 K/UL — HIGH (ref 0.1–0.6)
MONOCYTES # BLD AUTO: 0.96 K/UL — HIGH (ref 0.1–0.6)
MONOCYTES # BLD AUTO: 1.01 K/UL — HIGH (ref 0.1–0.6)
MONOCYTES NFR BLD AUTO: 10.2 % — HIGH (ref 1.7–9.3)
MONOCYTES NFR BLD AUTO: 9.2 % — SIGNIFICANT CHANGE UP (ref 1.7–9.3)
MONOCYTES NFR BLD AUTO: 9.8 % — HIGH (ref 1.7–9.3)
NEUTROPHILS # BLD AUTO: 6.67 K/UL — HIGH (ref 1.4–6.5)
NEUTROPHILS # BLD AUTO: 7.2 K/UL — HIGH (ref 1.4–6.5)
NEUTROPHILS # BLD AUTO: 7.41 K/UL — HIGH (ref 1.4–6.5)
NEUTROPHILS NFR BLD AUTO: 69.5 % — SIGNIFICANT CHANGE UP (ref 42.2–75.2)
NEUTROPHILS NFR BLD AUTO: 71.2 % — SIGNIFICANT CHANGE UP (ref 42.2–75.2)
NEUTROPHILS NFR BLD AUTO: 72.2 % — SIGNIFICANT CHANGE UP (ref 42.2–75.2)
NRBC # BLD: 0 /100 WBCS — SIGNIFICANT CHANGE UP (ref 0–0)
PLATELET # BLD AUTO: 184 K/UL — SIGNIFICANT CHANGE UP (ref 130–400)
PLATELET # BLD AUTO: 187 K/UL — SIGNIFICANT CHANGE UP (ref 130–400)
PLATELET # BLD AUTO: 199 K/UL — SIGNIFICANT CHANGE UP (ref 130–400)
PMV BLD: 10.8 FL — HIGH (ref 7.4–10.4)
PMV BLD: 11.1 FL — HIGH (ref 7.4–10.4)
PMV BLD: 11.5 FL — HIGH (ref 7.4–10.4)
POTASSIUM SERPL-MCNC: 4.3 MMOL/L — SIGNIFICANT CHANGE UP (ref 3.5–5)
POTASSIUM SERPL-SCNC: 4.3 MMOL/L — SIGNIFICANT CHANGE UP (ref 3.5–5)
PROTHROM AB SERPL-ACNC: 18.2 SEC — HIGH (ref 9.95–12.87)
RBC # BLD: 3.14 M/UL — LOW (ref 4.7–6.1)
RBC # BLD: 3.28 M/UL — LOW (ref 4.7–6.1)
RBC # BLD: 3.5 M/UL — LOW (ref 4.7–6.1)
RBC # FLD: 18.1 % — HIGH (ref 11.5–14.5)
RBC # FLD: 18.3 % — HIGH (ref 11.5–14.5)
RBC # FLD: 18.6 % — HIGH (ref 11.5–14.5)
SODIUM SERPL-SCNC: 142 MMOL/L — SIGNIFICANT CHANGE UP (ref 135–146)
WBC # BLD: 10.26 K/UL — SIGNIFICANT CHANGE UP (ref 4.8–10.8)
WBC # BLD: 10.35 K/UL — SIGNIFICANT CHANGE UP (ref 4.8–10.8)
WBC # BLD: 9.38 K/UL — SIGNIFICANT CHANGE UP (ref 4.8–10.8)
WBC # FLD AUTO: 10.26 K/UL — SIGNIFICANT CHANGE UP (ref 4.8–10.8)
WBC # FLD AUTO: 10.35 K/UL — SIGNIFICANT CHANGE UP (ref 4.8–10.8)
WBC # FLD AUTO: 9.38 K/UL — SIGNIFICANT CHANGE UP (ref 4.8–10.8)

## 2024-07-06 PROCEDURE — 99233 SBSQ HOSP IP/OBS HIGH 50: CPT

## 2024-07-06 PROCEDURE — 51702 INSERT TEMP BLADDER CATH: CPT

## 2024-07-06 PROCEDURE — 99232 SBSQ HOSP IP/OBS MODERATE 35: CPT

## 2024-07-06 RX ORDER — CEFEPIME 1 G/1
1000 INJECTION, POWDER, FOR SOLUTION INTRAMUSCULAR; INTRAVENOUS ONCE
Refills: 0 | Status: COMPLETED | OUTPATIENT
Start: 2024-07-06 | End: 2024-07-06

## 2024-07-06 RX ORDER — METOPROLOL TARTRATE 50 MG
25 TABLET ORAL DAILY
Refills: 0 | Status: DISCONTINUED | OUTPATIENT
Start: 2024-07-07 | End: 2024-07-11

## 2024-07-06 RX ORDER — CEFEPIME 1 G/1
INJECTION, POWDER, FOR SOLUTION INTRAMUSCULAR; INTRAVENOUS
Refills: 0 | Status: DISCONTINUED | OUTPATIENT
Start: 2024-07-06 | End: 2024-07-07

## 2024-07-06 RX ORDER — CEFEPIME 1 G/1
1000 INJECTION, POWDER, FOR SOLUTION INTRAMUSCULAR; INTRAVENOUS EVERY 12 HOURS
Refills: 0 | Status: DISCONTINUED | OUTPATIENT
Start: 2024-07-06 | End: 2024-07-07

## 2024-07-06 RX ADMIN — ROPINIROLE HYDROCHLORIDE 0.5 MILLIGRAM(S): 0.5 TABLET, FILM COATED ORAL at 18:10

## 2024-07-06 RX ADMIN — PANTOPRAZOLE SODIUM 40 MILLIGRAM(S): 40 INJECTION, POWDER, FOR SOLUTION INTRAVENOUS at 05:39

## 2024-07-06 RX ADMIN — TRAZODONE HYDROCHLORIDE 100 MILLIGRAM(S): 50 TABLET, FILM COATED ORAL at 11:08

## 2024-07-06 RX ADMIN — Medication 3 MILLIGRAM(S): at 21:56

## 2024-07-06 RX ADMIN — Medication 50 MILLIGRAM(S): at 05:39

## 2024-07-06 RX ADMIN — Medication 112 MICROGRAM(S): at 05:39

## 2024-07-06 RX ADMIN — Medication 1 APPLICATION(S): at 05:39

## 2024-07-06 RX ADMIN — ROPINIROLE HYDROCHLORIDE 0.5 MILLIGRAM(S): 0.5 TABLET, FILM COATED ORAL at 05:39

## 2024-07-06 RX ADMIN — Medication 20 MILLIGRAM(S): at 21:56

## 2024-07-06 RX ADMIN — CEFEPIME 100 MILLIGRAM(S): 1 INJECTION, POWDER, FOR SOLUTION INTRAMUSCULAR; INTRAVENOUS at 10:52

## 2024-07-06 RX ADMIN — CEFEPIME 100 MILLIGRAM(S): 1 INJECTION, POWDER, FOR SOLUTION INTRAMUSCULAR; INTRAVENOUS at 18:10

## 2024-07-06 NOTE — CHART NOTE - NSCHARTNOTEFT_GEN_A_CORE
.  Called to replace Urethral Martinez catheter that had fallen out earlier.  SPT still in place.    Under sterile technique, 22 [place] 3 way Martinez catheter replaced without resistance and bladder irrigated to light pink with sterile water and attached to bedside drainage.  (See procedure note).  Martinez draining well with light pink hematuria.  Nursing to continue q8 hour irrigations.  Stat-lock placed on right thigh with Martinez.      RN to monitor output.

## 2024-07-06 NOTE — PROGRESS NOTE ADULT - SUBJECTIVE AND OBJECTIVE BOX
SUBJECTIVE:    Patient is a 69y old Male who presents with a chief complaint of symptomatic anemia, gross hematuria (06 Jul 2024 09:03)    Currently admitted to medicine with the primary diagnosis of Symptomatic anemia       Today is hospital day 1d. This morning he is resting comfortably in bed and reports no new issues or overnight events.     reports feeling better     ROS:   CONSTITUTIONAL: No weakness, fevers or chills   EYES/ENT: No visual changes; No vertigo or throat pain   NECK: No pain or stiffness   RESPIRATORY: No cough, wheezing, hemoptysis; No shortness of breath   CARDIOVASCULAR: No chest pain or palpitations   GASTROINTESTINAL: No abdominal or epigastric pain. No nausea, vomiting, or hematemesis; No diarrhea or constipation. No melena or hematochezia.  GENITOURINARY: No dysuria, frequency or hematuria  NEUROLOGICAL: No numbness or weakness        PAST MEDICAL & SURGICAL HISTORY  Atrial fibrillation    Overactive bladder    BPH (benign prostatic hyperplasia)    CVA (cerebrovascular accident)    Multiple sclerosis    Kidney stone    Suprapubic catheter    Prediabetes    Hypothyroid    History of penile implant    History of suprapubic catheter    H/O hernia repair  abdominal with mesh      SOCIAL HISTORY:    ALLERGIES:  No Known Allergies    MEDICATIONS:  STANDING MEDICATIONS  cefepime   IVPB 1000 milliGRAM(s) IV Intermittent every 12 hours  cefepime   IVPB      chlorhexidine 2% Cloths 1 Application(s) Topical <User Schedule>  insulin lispro (ADMELOG) corrective regimen sliding scale   SubCutaneous three times a day before meals  lactated ringers. 1000 milliLiter(s) IV Continuous <Continuous>  levothyroxine 112 MICROGram(s) Oral daily  metoprolol succinate ER 50 milliGRAM(s) Oral daily  pantoprazole    Tablet 40 milliGRAM(s) Oral before breakfast  rOPINIRole 0.5 milliGRAM(s) Oral two times a day  simvastatin 20 milliGRAM(s) Oral at bedtime  traZODone 100 milliGRAM(s) Oral daily    PRN MEDICATIONS  acetaminophen     Tablet .. 650 milliGRAM(s) Oral every 6 hours PRN  dextrose Oral Gel 15 Gram(s) Oral once PRN  melatonin 3 milliGRAM(s) Oral at bedtime PRN  ondansetron Injectable 4 milliGRAM(s) IV Push every 8 hours PRN    VITALS:   T(F): 97.5  HR: 81  BP: 109/68  RR: 18  SpO2: 100%    LABS:  Negative for smoking/alcohol/drug use.                         8.5    10.26 )-----------( 187      ( 06 Jul 2024 11:00 )             26.0     07-06    142  |  107  |  17  ----------------------------<  97  4.3   |  26  |  1.2    Ca    8.5      06 Jul 2024 07:00  Mg     2.1     07-06    TPro  6.3  /  Alb  3.6  /  TBili  0.3  /  DBili  x   /  AST  17  /  ALT  10  /  AlkPhos  54  07-05    PT/INR - ( 06 Jul 2024 07:00 )   PT: 18.20 sec;   INR: 1.59 ratio         PTT - ( 06 Jul 2024 07:00 )  PTT:31.1 sec  Urinalysis Basic - ( 06 Jul 2024 07:00 )    Color: x / Appearance: x / SG: x / pH: x  Gluc: 97 mg/dL / Ketone: x  / Bili: x / Urobili: x   Blood: x / Protein: x / Nitrite: x   Leuk Esterase: x / RBC: x / WBC x   Sq Epi: x / Non Sq Epi: x / Bacteria: x            Urinalysis with Rflx Culture (collected 05 Jul 2024 17:50)          RADIOLOGY:    PHYSICAL EXAM:  GEN: No acute distress  HEENT: normocephalic, atraumatic, aniceteric  LUNGS: Clear to auscultation bilaterally, no rales/wheezing/ rhonchi  HEART: S1/S2 present. RRR, no murmurs  ABD: Soft, non-tender, non-distended. Bowel sounds present  EXT: no edema   NEURO: AAOX3, normal affect      ASSESSMENT AND PLAN:    68 y/o male with hx of atrial fibrillation (on Eliquis), overactive bladder, BPH, CVA with right sided weakness, multiple sclerosis, kidney stone, suprapubic catheter, history of penile implant, h/o hernia repair presents to the ED from Dr. Cedeno office for evaluation of gross hematuria for 9 days and increased weakness that he noticed this am. Patient s/p cystolithopaxy POD#9. States that he has been having hematuria since the procedure. He had 1 episode of vomiting this am, c/o back pain which is chronic. Denies fevers, chills abdominal pain, back pain, nausea, headache, dysuria, CP, SOB.     # Gross Hematuria sp cysto ~ 1 week ago   # Symptomatic anemia sp 1 unit prbc in ED - improving   # Complicated UTI   # H/O Chronic Urinary retention sp Chronic SPC  # H/O BPH   - U/A + , fu urine clx   - RBUS noted w/ clots sp irrigation with urology   - Urology recs: Monitor for clot retention, possible CBI, bladder irrigation- flush with 100 cc q6 hours  - HOLD Eliquis - will need urology clearance to resume AC  - Cefepime, ID EVAL    #DANIEL - improving   post obstructive  - rbus w/ clots and > 800 urine - sp SPC exchange and calvert placement in ED  - IVF LR   - hold lasix     #H/O Chronic A fib - rate controlled now   - chadsvasc 3   - HELD home Eliquis given active bleeding   - c/w metoprolol (dose lowered to 25 mg qd)    # H/O CVA - on eliquis at home / cw statin    #H/O Hypothyroidism-c/w synthroid     #H/O DM2 -I hold home po medications ; insulin inpatient; carb diet     dvt/ gi ppx/diet  dispo: acute  handoff: fu urine clx / urology clearance for when AC can be resumed  SUBJECTIVE:    Patient is a 69y old Male who presents with a chief complaint of symptomatic anemia, gross hematuria (06 Jul 2024 09:03)    Currently admitted to medicine with the primary diagnosis of Symptomatic anemia       Today is hospital day 1d. This morning he is resting comfortably in bed and reports no new issues or overnight events.     reports feeling better     ROS:   CONSTITUTIONAL: No weakness, fevers or chills   EYES/ENT: No visual changes; No vertigo or throat pain   NECK: No pain or stiffness   RESPIRATORY: No cough, wheezing, hemoptysis; No shortness of breath   CARDIOVASCULAR: No chest pain or palpitations   GASTROINTESTINAL: No abdominal or epigastric pain. No nausea, vomiting, or hematemesis; No diarrhea or constipation. No melena or hematochezia.  GENITOURINARY: No dysuria, frequency or hematuria  NEUROLOGICAL: No numbness or weakness        PAST MEDICAL & SURGICAL HISTORY  Atrial fibrillation    Overactive bladder    BPH (benign prostatic hyperplasia)    CVA (cerebrovascular accident)    Multiple sclerosis    Kidney stone    Suprapubic catheter    Prediabetes    Hypothyroid    History of penile implant    History of suprapubic catheter    H/O hernia repair  abdominal with mesh      SOCIAL HISTORY:    ALLERGIES:  No Known Allergies    MEDICATIONS:  STANDING MEDICATIONS  cefepime   IVPB 1000 milliGRAM(s) IV Intermittent every 12 hours  cefepime   IVPB      chlorhexidine 2% Cloths 1 Application(s) Topical <User Schedule>  insulin lispro (ADMELOG) corrective regimen sliding scale   SubCutaneous three times a day before meals  lactated ringers. 1000 milliLiter(s) IV Continuous <Continuous>  levothyroxine 112 MICROGram(s) Oral daily  metoprolol succinate ER 50 milliGRAM(s) Oral daily  pantoprazole    Tablet 40 milliGRAM(s) Oral before breakfast  rOPINIRole 0.5 milliGRAM(s) Oral two times a day  simvastatin 20 milliGRAM(s) Oral at bedtime  traZODone 100 milliGRAM(s) Oral daily    PRN MEDICATIONS  acetaminophen     Tablet .. 650 milliGRAM(s) Oral every 6 hours PRN  dextrose Oral Gel 15 Gram(s) Oral once PRN  melatonin 3 milliGRAM(s) Oral at bedtime PRN  ondansetron Injectable 4 milliGRAM(s) IV Push every 8 hours PRN    VITALS:   T(F): 97.5  HR: 81  BP: 109/68  RR: 18  SpO2: 100%    LABS:  Negative for smoking/alcohol/drug use.                         8.5    10.26 )-----------( 187      ( 06 Jul 2024 11:00 )             26.0     07-06    142  |  107  |  17  ----------------------------<  97  4.3   |  26  |  1.2    Ca    8.5      06 Jul 2024 07:00  Mg     2.1     07-06    TPro  6.3  /  Alb  3.6  /  TBili  0.3  /  DBili  x   /  AST  17  /  ALT  10  /  AlkPhos  54  07-05    PT/INR - ( 06 Jul 2024 07:00 )   PT: 18.20 sec;   INR: 1.59 ratio         PTT - ( 06 Jul 2024 07:00 )  PTT:31.1 sec  Urinalysis Basic - ( 06 Jul 2024 07:00 )    Color: x / Appearance: x / SG: x / pH: x  Gluc: 97 mg/dL / Ketone: x  / Bili: x / Urobili: x   Blood: x / Protein: x / Nitrite: x   Leuk Esterase: x / RBC: x / WBC x   Sq Epi: x / Non Sq Epi: x / Bacteria: x            Urinalysis with Rflx Culture (collected 05 Jul 2024 17:50)          RADIOLOGY:    PHYSICAL EXAM:  GEN: No acute distress  HEENT: normocephalic, atraumatic, aniceteric  LUNGS: Clear to auscultation bilaterally, no rales/wheezing/ rhonchi  HEART: S1/S2 present. RRR, no murmurs  ABD: Soft, non-tender, non-distended. Bowel sounds present  EXT: no edema   NEURO: AAOX3, normal affect      ASSESSMENT AND PLAN:    68 y/o male with hx of atrial fibrillation (on Eliquis), overactive bladder, BPH, CVA with right sided weakness, multiple sclerosis, kidney stone, suprapubic catheter, history of penile implant, h/o hernia repair presents to the ED from Dr. Cedeno office for evaluation of gross hematuria for 9 days and increased weakness that he noticed this am. Patient s/p cystolithopaxy POD#9. States that he has been having hematuria since the procedure. He had 1 episode of vomiting this am, c/o back pain which is chronic. Denies fevers, chills abdominal pain, back pain, nausea, headache, dysuria, CP, SOB.     # Gross Hematuria sp cysto ~ 1 week ago   # Symptomatic anemia sp 1 unit prbc in ED - improving   # Complicated UTI   # H/O Chronic Urinary retention sp Chronic SPC  # H/O BPH   - U/A + , fu urine clx   - RBUS noted w/ clots sp irrigation with urology   - Urology recs: Monitor for clot retention/CBI, bladder irrigation- flush with 100 cc q6 hours  - HOLD Eliquis - will need urology clearance to resume AC  - Cefepime, ID EVAL    #DANIEL - improving   post obstructive  - rbus w/ clots and > 800 urine - sp SPC exchange and calvert placement in ED  - IVF LR   - hold lasix     #H/O Chronic A fib - rate controlled now   - chadsvasc 3   - HELD home Eliquis given active bleeding   - c/w metoprolol (dose lowered to 25 mg qd)    # H/O CVA - on eliquis at home / cw statin    #H/O Hypothyroidism-c/w synthroid     #H/O DM2 -I hold home po medications ; insulin inpatient; carb diet     dvt/ gi ppx/diet  dispo: acute  handoff: fu urine clx / urology clearance for when AC can be resumed

## 2024-07-06 NOTE — PROGRESS NOTE ADULT - SUBJECTIVE AND OBJECTIVE BOX
.  s/p Bladder stone lithotripsy-- POD #10, now with hematuria (resolving) & UTI    Patient seen & examined with Dr. Martin this morning.  Patient denies complaints.  Feels well.  Urethral Martinez catheter draining dark old hematuria.  No bright red hematuria.    SPT clamped since yesterday afternoon.            MEDICATIONS  (STANDING):  chlorhexidine 2% Cloths 1 Application(s) Topical <User Schedule>  insulin lispro (ADMELOG) corrective regimen sliding scale   SubCutaneous three times a day before meals  lactated ringers. 1000 milliLiter(s) (100 mL/Hr) IV Continuous <Continuous>  levothyroxine 112 MICROGram(s) Oral daily  metoprolol succinate ER 50 milliGRAM(s) Oral daily  pantoprazole    Tablet 40 milliGRAM(s) Oral before breakfast  rOPINIRole 0.5 milliGRAM(s) Oral two times a day  simvastatin 20 milliGRAM(s) Oral at bedtime  traZODone 100 milliGRAM(s) Oral daily    MEDICATIONS  (PRN):  acetaminophen     Tablet .. 650 milliGRAM(s) Oral every 6 hours PRN Temp greater or equal to 38C (100.4F), Mild Pain (1 - 3)  dextrose Oral Gel 15 Gram(s) Oral once PRN Blood Glucose LESS THAN 70 milliGRAM(s)/deciliter  melatonin 3 milliGRAM(s) Oral at bedtime PRN Insomnia  ondansetron Injectable 4 milliGRAM(s) IV Push every 8 hours PRN Nausea and/or Vomiting            I&O's Detail    2024 07:01  -  2024 07:00  --------------------------------------------------------  IN:  Total IN: 0 mL    OUT:    Indwelling Catheter - Suprapubic (mL): 430 mL    Voided (mL): 550 mL  Total OUT: 980 mL    Total NET: -980 mL          Vital Signs Last 24 Hrs  T(C): 36.4 (2024 04:58), Max: 37 (2024 15:34)  T(F): 97.5 (2024 04:58), Max: 98.6 (2024 15:34)  HR: 81 (2024 04:58) (72 - 112)  BP: 109/68 (2024 04:58) (92/60 - 143/74)  RR: 18 (2024 04:58) (16 - 20)  SpO2: 100% (2024 04:58) (88% - 100%)    Parameters below as of 2024 04:58  Patient On (Oxygen Delivery Method): room air            Physical exam  General: Wd, Wn, male conversant in NAD.  Abdomen:  + BS, soft, no distention, non-tender, No rebound, guarding or peritoneal signs.  Genitourinary:  (+) Urethral Martinez catheter (25 F with 5 cc balloon) in place draining dark old hematuria,  (+) SPT (14F Martinez in place), currently clamped.  No suprapubic tenderness, No CVAT.             LABS:                         8.1    9.38  )-----------( 184      ( 2024 07:00 )             24.9     07-05    135  |  97<L>  |  26<H>  ----------------------------<  100<H>  4.7   |  25  |  1.6<H>    Ca    9.0      2024 13:40    TPro  6.3  /  Alb  3.6  /  TBili  0.3  /  DBili  x   /  AST  17  /  ALT  10  /  AlkPhos  54  07-05        Urinalysis Basic - ( 2024 17:50 )    Color: Red / Appearance: Turbid / S.017 / pH: x  Gluc: x / Ketone: Negative mg/dL  / Bili: Large / Urobili: 0.2 mg/dL   Blood: x / Protein: 100 mg/dL / Nitrite: Positive   Leuk Esterase: Large / RBC: Too Numerous to count /HPF / WBC 50 /HPF   Sq Epi: x / Non Sq Epi: x / Bacteria: Few /HPF

## 2024-07-06 NOTE — PROGRESS NOTE ADULT - ASSESSMENT
.  Impression:  69 y.o M w/ PMHx of Afib on eliquis, Overactive bladder, BPH, CVA with right sided weakness, Multiple sclerosis, Kidney stone, Suprapubic catheter, History of penile implant, H/O hernia repair presents to the ED for evaluation of gross hematuria x 1 week and increased weakness that he noticed this am. Patient s/p Cystolitholapaxy POD#9. States that he has been having hematuria since the procedure. He had 1 episode of vomiting this am, c/o back pain which is chronic. Denies abdominal pain, nausea, headache, dysuria, CP, SOB.   # s/p Cystolitholapaxy - POD #10.  # Hematuria 2/2 Eliquis and recent Cysto and active UTI  # Anemia  # UTI      Plan:  # Hematuria 2/2 Eliquis and recent Cysto and active UTI  # Anemia  # UTI     - IVF's  - Ivabx.  - Monitor urine output from urethral Martinez  - Continue bladder irrigation q6h by nursing to evacuate any clots   - Renal bladder Sono shows large clot in bladder  - Cardio consult for DOAC   - Trend cbc -- H/H=8.1/24.9 today  - Patient seen, examined and discussed with Dr. Martin     .  Impression:  69 y.o M w/ PMHx of Afib on Eliquis, Overactive bladder, BPH, CVA with right sided weakness, Multiple sclerosis, Kidney stone, Suprapubic catheter, History of penile implant, H/O hernia repair presents to the ED for evaluation of gross hematuria x 1 week and increased weakness that he noticed this am. Patient s/p Cystolitholapaxy POD#9. States that he has been having hematuria since the procedure. He had 1 episode of vomiting this am, c/o back pain which is chronic. Denies abdominal pain, nausea, headache, dysuria, CP, SOB.   # s/p Cystolitholapaxy - POD #10.  # Hematuria 2/2 Eliquis and recent Cysto and active UTI  # Anemia  # UTI      Plan:  # s/p Cystolitholapaxy - POD #10.  # Hematuria 2/2 Eliquis and recent Cysto and active UTI  # Anemia  # UTI     - IVF's  - Ivabx.  - Monitor urine output from urethral Martinez  - Continue bladder irrigation q6h by nursing to evacuate any clots   - Renal bladder Sono shows large clot in bladder  - Cardio consult for DOAC   - Trend cbc -- H/H=8.1/24.9 today  - Patient seen, examined and discussed with Dr. Martin

## 2024-07-07 LAB
ALBUMIN SERPL ELPH-MCNC: 3.4 G/DL — LOW (ref 3.5–5.2)
ALP SERPL-CCNC: 53 U/L — SIGNIFICANT CHANGE UP (ref 30–115)
ALT FLD-CCNC: 8 U/L — SIGNIFICANT CHANGE UP (ref 0–41)
ANION GAP SERPL CALC-SCNC: 10 MMOL/L — SIGNIFICANT CHANGE UP (ref 7–14)
AST SERPL-CCNC: 14 U/L — SIGNIFICANT CHANGE UP (ref 0–41)
BASOPHILS # BLD AUTO: 0.03 K/UL — SIGNIFICANT CHANGE UP (ref 0–0.2)
BASOPHILS NFR BLD AUTO: 0.3 % — SIGNIFICANT CHANGE UP (ref 0–1)
BILIRUB SERPL-MCNC: 0.3 MG/DL — SIGNIFICANT CHANGE UP (ref 0.2–1.2)
BUN SERPL-MCNC: 15 MG/DL — SIGNIFICANT CHANGE UP (ref 10–20)
CALCIUM SERPL-MCNC: 9 MG/DL — SIGNIFICANT CHANGE UP (ref 8.4–10.5)
CHLORIDE SERPL-SCNC: 104 MMOL/L — SIGNIFICANT CHANGE UP (ref 98–110)
CO2 SERPL-SCNC: 25 MMOL/L — SIGNIFICANT CHANGE UP (ref 17–32)
CREAT SERPL-MCNC: 1.1 MG/DL — SIGNIFICANT CHANGE UP (ref 0.7–1.5)
EGFR: 72 ML/MIN/1.73M2 — SIGNIFICANT CHANGE UP
EOSINOPHIL # BLD AUTO: 0.25 K/UL — SIGNIFICANT CHANGE UP (ref 0–0.7)
EOSINOPHIL NFR BLD AUTO: 2.5 % — SIGNIFICANT CHANGE UP (ref 0–8)
GLUCOSE BLDC GLUCOMTR-MCNC: 127 MG/DL — HIGH (ref 70–99)
GLUCOSE BLDC GLUCOMTR-MCNC: 145 MG/DL — HIGH (ref 70–99)
GLUCOSE BLDC GLUCOMTR-MCNC: 154 MG/DL — HIGH (ref 70–99)
GLUCOSE BLDC GLUCOMTR-MCNC: 180 MG/DL — HIGH (ref 70–99)
GLUCOSE SERPL-MCNC: 107 MG/DL — HIGH (ref 70–99)
HCT VFR BLD CALC: 28.7 % — LOW (ref 42–52)
HGB BLD-MCNC: 9.1 G/DL — LOW (ref 14–18)
IMM GRANULOCYTES NFR BLD AUTO: 1.8 % — HIGH (ref 0.1–0.3)
LYMPHOCYTES # BLD AUTO: 1.85 K/UL — SIGNIFICANT CHANGE UP (ref 1.2–3.4)
LYMPHOCYTES # BLD AUTO: 18.9 % — LOW (ref 20.5–51.1)
MCHC RBC-ENTMCNC: 25.6 PG — LOW (ref 27–31)
MCHC RBC-ENTMCNC: 31.7 G/DL — LOW (ref 32–37)
MCV RBC AUTO: 80.6 FL — SIGNIFICANT CHANGE UP (ref 80–94)
MONOCYTES # BLD AUTO: 0.92 K/UL — HIGH (ref 0.1–0.6)
MONOCYTES NFR BLD AUTO: 9.4 % — HIGH (ref 1.7–9.3)
NEUTROPHILS # BLD AUTO: 6.58 K/UL — HIGH (ref 1.4–6.5)
NEUTROPHILS NFR BLD AUTO: 67.1 % — SIGNIFICANT CHANGE UP (ref 42.2–75.2)
NRBC # BLD: 0 /100 WBCS — SIGNIFICANT CHANGE UP (ref 0–0)
PLATELET # BLD AUTO: 214 K/UL — SIGNIFICANT CHANGE UP (ref 130–400)
PMV BLD: 11.7 FL — HIGH (ref 7.4–10.4)
POTASSIUM SERPL-MCNC: 4.2 MMOL/L — SIGNIFICANT CHANGE UP (ref 3.5–5)
POTASSIUM SERPL-SCNC: 4.2 MMOL/L — SIGNIFICANT CHANGE UP (ref 3.5–5)
PROT SERPL-MCNC: 6.1 G/DL — SIGNIFICANT CHANGE UP (ref 6–8)
RBC # BLD: 3.56 M/UL — LOW (ref 4.7–6.1)
RBC # FLD: 18.6 % — HIGH (ref 11.5–14.5)
SODIUM SERPL-SCNC: 139 MMOL/L — SIGNIFICANT CHANGE UP (ref 135–146)
WBC # BLD: 9.81 K/UL — SIGNIFICANT CHANGE UP (ref 4.8–10.8)
WBC # FLD AUTO: 9.81 K/UL — SIGNIFICANT CHANGE UP (ref 4.8–10.8)

## 2024-07-07 PROCEDURE — 99231 SBSQ HOSP IP/OBS SF/LOW 25: CPT

## 2024-07-07 PROCEDURE — 99233 SBSQ HOSP IP/OBS HIGH 50: CPT

## 2024-07-07 RX ORDER — MEROPENEM 500 MG/1
1000 INJECTION, POWDER, FOR SOLUTION INTRAVENOUS ONCE
Refills: 0 | Status: COMPLETED | OUTPATIENT
Start: 2024-07-07 | End: 2024-07-07

## 2024-07-07 RX ORDER — MEROPENEM 500 MG/1
1000 INJECTION, POWDER, FOR SOLUTION INTRAVENOUS EVERY 8 HOURS
Refills: 0 | Status: DISCONTINUED | OUTPATIENT
Start: 2024-07-07 | End: 2024-07-10

## 2024-07-07 RX ORDER — MEROPENEM 500 MG/1
INJECTION, POWDER, FOR SOLUTION INTRAVENOUS
Refills: 0 | Status: DISCONTINUED | OUTPATIENT
Start: 2024-07-07 | End: 2024-07-10

## 2024-07-07 RX ORDER — TRAZODONE HYDROCHLORIDE 50 MG/1
100 TABLET, FILM COATED ORAL AT BEDTIME
Refills: 0 | Status: DISCONTINUED | OUTPATIENT
Start: 2024-07-07 | End: 2024-07-11

## 2024-07-07 RX ADMIN — DEXTROSE MONOHYDRATE AND SODIUM CHLORIDE 100 MILLILITER(S): 5; .3 INJECTION, SOLUTION INTRAVENOUS at 06:48

## 2024-07-07 RX ADMIN — MEROPENEM 100 MILLIGRAM(S): 500 INJECTION, POWDER, FOR SOLUTION INTRAVENOUS at 21:29

## 2024-07-07 RX ADMIN — PANTOPRAZOLE SODIUM 40 MILLIGRAM(S): 40 INJECTION, POWDER, FOR SOLUTION INTRAVENOUS at 06:05

## 2024-07-07 RX ADMIN — INSULIN LISPRO 1: 100 INJECTION, SOLUTION SUBCUTANEOUS at 17:13

## 2024-07-07 RX ADMIN — Medication 1 APPLICATION(S): at 06:09

## 2024-07-07 RX ADMIN — ROPINIROLE HYDROCHLORIDE 0.5 MILLIGRAM(S): 0.5 TABLET, FILM COATED ORAL at 06:05

## 2024-07-07 RX ADMIN — INSULIN LISPRO 1: 100 INJECTION, SOLUTION SUBCUTANEOUS at 11:59

## 2024-07-07 RX ADMIN — Medication 25 MILLIGRAM(S): at 06:05

## 2024-07-07 RX ADMIN — TRAZODONE HYDROCHLORIDE 100 MILLIGRAM(S): 50 TABLET, FILM COATED ORAL at 21:30

## 2024-07-07 RX ADMIN — Medication 112 MICROGRAM(S): at 06:05

## 2024-07-07 RX ADMIN — MEROPENEM 100 MILLIGRAM(S): 500 INJECTION, POWDER, FOR SOLUTION INTRAVENOUS at 11:52

## 2024-07-07 RX ADMIN — CEFEPIME 100 MILLIGRAM(S): 1 INJECTION, POWDER, FOR SOLUTION INTRAMUSCULAR; INTRAVENOUS at 06:05

## 2024-07-07 RX ADMIN — DEXTROSE MONOHYDRATE AND SODIUM CHLORIDE 100 MILLILITER(S): 5; .3 INJECTION, SOLUTION INTRAVENOUS at 22:47

## 2024-07-07 RX ADMIN — ROPINIROLE HYDROCHLORIDE 0.5 MILLIGRAM(S): 0.5 TABLET, FILM COATED ORAL at 17:14

## 2024-07-07 RX ADMIN — Medication 20 MILLIGRAM(S): at 21:29

## 2024-07-07 NOTE — PHYSICAL THERAPY INITIAL EVALUATION ADULT - GENERAL OBSERVATIONS, REHAB EVAL
10:30 -10:55 Chart reviewed. Order received.  Patient is ok to be  seen for Pt, confirmed with RN. pt encountered  Semi roland in bed denies pain, and agrees to participate in session, +  Ric , +BARBER Martinez.

## 2024-07-07 NOTE — PROGRESS NOTE ADULT - SUBJECTIVE AND OBJECTIVE BOX
.  Patient seen & examined.  No acute events noted overnight.    Patient reports no pain or new complaints.  Urethral Martinez draining well and urine appears to be clearing with irrigations and some old clots noted in tubing.          MEDICATIONS  (STANDING):  cefepime   IVPB 1000 milliGRAM(s) IV Intermittent every 12 hours  chlorhexidine 2% Cloths 1 Application(s) Topical <User Schedule>  insulin lispro (ADMELOG) corrective regimen sliding scale   SubCutaneous three times a day before meals  lactated ringers. 1000 milliLiter(s) (100 mL/Hr) IV Continuous <Continuous>  levothyroxine 112 MICROGram(s) Oral daily  metoprolol succinate ER 25 milliGRAM(s) Oral daily  pantoprazole    Tablet 40 milliGRAM(s) Oral before breakfast  rOPINIRole 0.5 milliGRAM(s) Oral two times a day  simvastatin 20 milliGRAM(s) Oral at bedtime  traZODone 100 milliGRAM(s) Oral daily    MEDICATIONS  (PRN):  acetaminophen     Tablet .. 650 milliGRAM(s) Oral every 6 hours PRN Temp greater or equal to 38C (100.4F), Mild Pain (1 - 3)  dextrose Oral Gel 15 Gram(s) Oral once PRN Blood Glucose LESS THAN 70 milliGRAM(s)/deciliter  melatonin 3 milliGRAM(s) Oral at bedtime PRN Insomnia  ondansetron Injectable 4 milliGRAM(s) IV Push every 8 hours PRN Nausea and/or Vomiting          I&O's Detail    06 Jul 2024 07:01  -  07 Jul 2024 07:00  --------------------------------------------------------  IN:  Total IN: 0 mL    OUT:    Indwelling Catheter - Suprapubic (mL): 580 mL    Indwelling Catheter - Urethral (mL): 900 mL    Voided (mL): 2700 mL  Total OUT: 4180 mL    Total NET: -4180 mL          Vital Signs Last 24 Hrs  T(C): 36.5 (07 Jul 2024 04:55), Max: 36.7 (06 Jul 2024 21:07)  T(F): 97.7 (07 Jul 2024 04:55), Max: 98 (06 Jul 2024 21:07)  HR: 87 (07 Jul 2024 04:55) (67 - 87)  BP: 102/65 (07 Jul 2024 04:55) (94/56 - 121/80)  RR: 18 (07 Jul 2024 04:55) (18 - 18)  SpO2: 96% (07 Jul 2024 04:55) (96% - 96%)    Parameters below as of 07 Jul 2024 04:55  Patient On (Oxygen Delivery Method): room air            Physical exam  General: Wd, Wn, conversant in NAD.  Lungs:  Clear to auscultation, No wheezes, rale or rhonchi.  Cor:  S1 & S2, No murmurs, rubs or gallops.  Abdomen:  + BS, soft, no distention, non-tender, No rebound, guarding or peritoneal signs.  Genitourinary:  No suprapubic tenderness, No CVAT.   Extremity:  No clubbing, cyanosis or edema.  No calf tenderness.    Neuro:  No focal deficits.           LABS:                        9.1    9.81  )-----------( 214      ( 07 Jul 2024 07:03 )             28.7     07-07    139  |  104  |  15  ----------------------------<  107<H>  4.2   |  25  |  1.1    Ca    9.0      07 Jul 2024 07:03  Mg     2.1     07-06    TPro  6.1  /  Alb  3.4<L>  /  TBili  0.3  /  DBili  x   /  AST  14  /  ALT  8   /  AlkPhos  53  07-07        Urinalysis Basic - ( 07 Jul 2024 07:03 )    Color: x / Appearance: x / SG: x / pH: x  Gluc: 107 mg/dL / Ketone: x  / Bili: x / Urobili: x   Blood: x / Protein: x / Nitrite: x   Leuk Esterase: x / RBC: x / WBC x   Sq Epi: x / Non Sq Epi: x / Bacteria: x      Urine Culture:       Urinalysis with Rflx Culture (collected 05 Jul 2024 17:50)            Radiology     .  s/p Bladder stone lithotripsy-- POD #11, now with hematuria (resolving) & UTI      Patient seen & examined.  No acute events noted overnight.    Patient reports no pain or new complaints.  Urethral Martinez draining well and urine appears to be clearing with irrigations with some old clots noted in tubing.    SPT with dark hematuria noted.    Indwelling Catheter - Suprapubic (mL): 580 mL    Indwelling Catheter - Urethral (mL): 900 mL        MEDICATIONS  (STANDING):  cefepime   IVPB 1000 milliGRAM(s) IV Intermittent every 12 hours  chlorhexidine 2% Cloths 1 Application(s) Topical <User Schedule>  insulin lispro (ADMELOG) corrective regimen sliding scale   SubCutaneous three times a day before meals  lactated ringers. 1000 milliLiter(s) (100 mL/Hr) IV Continuous <Continuous>  levothyroxine 112 MICROGram(s) Oral daily  metoprolol succinate ER 25 milliGRAM(s) Oral daily  pantoprazole    Tablet 40 milliGRAM(s) Oral before breakfast  rOPINIRole 0.5 milliGRAM(s) Oral two times a day  simvastatin 20 milliGRAM(s) Oral at bedtime  traZODone 100 milliGRAM(s) Oral daily    MEDICATIONS  (PRN):  acetaminophen     Tablet .. 650 milliGRAM(s) Oral every 6 hours PRN Temp greater or equal to 38C (100.4F), Mild Pain (1 - 3)  dextrose Oral Gel 15 Gram(s) Oral once PRN Blood Glucose LESS THAN 70 milliGRAM(s)/deciliter  melatonin 3 milliGRAM(s) Oral at bedtime PRN Insomnia  ondansetron Injectable 4 milliGRAM(s) IV Push every 8 hours PRN Nausea and/or Vomiting          I&O's Detail    06 Jul 2024 07:01  -  07 Jul 2024 07:00  --------------------------------------------------------  IN:  Total IN: 0 mL    OUT:    Indwelling Catheter - Suprapubic (mL): 580 mL    Indwelling Catheter - Urethral (mL): 900 mL    Voided (mL): 2700 mL  Total OUT: 4180 mL    Total NET: -4180 mL          Vital Signs Last 24 Hrs  T(C): 36.5 (07 Jul 2024 04:55), Max: 36.7 (06 Jul 2024 21:07)  T(F): 97.7 (07 Jul 2024 04:55), Max: 98 (06 Jul 2024 21:07)  HR: 87 (07 Jul 2024 04:55) (67 - 87)  BP: 102/65 (07 Jul 2024 04:55) (94/56 - 121/80)  RR: 18 (07 Jul 2024 04:55) (18 - 18)  SpO2: 96% (07 Jul 2024 04:55) (96% - 96%)    Parameters below as of 07 Jul 2024 04:55  Patient On (Oxygen Delivery Method): room air            Physical exam  General: Wd, Wn, male conversant in NAD.  Abdomen:  + BS, soft, no distention, non-tender, No rebound, guarding or peritoneal signs.  Genitourinary:  (+) SPT (14F Martinez in place) draining dark hematuria.  (+) Urethral Martinez (21 F with 30 cc balloon in place) draining dark yellow urine with clots noted in tubing.  No suprapubic tenderness, No CVAT.           LABS:                         9.1    9.81  )-----------( 214      ( 07 Jul 2024 07:03 )             28.7     07-07    139  |  104  |  15  ----------------------------<  107<H>  4.2   |  25  |  1.1    Ca    9.0      07 Jul 2024 07:03  Mg     2.1     07-06    TPro  6.1  /  Alb  3.4<L>  /  TBili  0.3  /  DBili  x   /  AST  14  /  ALT  8   /  AlkPhos  53  07-07        Urinalysis Basic - ( 07 Jul 2024 07:03 )    Color: x / Appearance: x / SG: x / pH: x  Gluc: 107 mg/dL / Ketone: x  / Bili: x / Urobili: x   Blood: x / Protein: x / Nitrite: x   Leuk Esterase: x / RBC: x / WBC x   Sq Epi: x / Non Sq Epi: x / Bacteria: x

## 2024-07-07 NOTE — PHYSICAL THERAPY INITIAL EVALUATION ADULT - ADDITIONAL COMMENTS
As per pt he lives in an apartment W/ 2 Steps to enter W/ BL HR  and all in one level inside the apartment.  as per pt he was independent W/ all functional activity PTA using Rollator and SC .

## 2024-07-07 NOTE — PHYSICAL THERAPY INITIAL EVALUATION ADULT - PERTINENT HX OF CURRENT PROBLEM, REHAB EVAL
70 y/o male with hx of atrial fibrillation (on Eliquis), overactive bladder, BPH , CVA with right sided weakness, multiple sclerosis, kidney stone, suprapubic catheter, history of penile implant, h/o hernia repair presents to the ED from Dr. Cedeno office for evaluation of gross hematuria for 9 days and increased weakness that he noticed this am. Patient s/p cystolithopaxy POD#9. States that he has been having hematuria since the procedure. He had 1 episode of vomiting this am, c/o back pain which is chronic. Denies fevers, chills abdominal pain,  nausea, headache, dysuria, CP, SOB.

## 2024-07-07 NOTE — CONSULT NOTE ADULT - SUBJECTIVE AND OBJECTIVE BOX
INFECTIOUS DISEASE CONSULT NOTE    Patient is a 69y old  Male who presents with a chief complaint of symptomatic anemia, gross hematuria (06 Jul 2024 12:30)    HPI:  This is a 68 y/o male with hx of atrial fibrillation (on Eliquis), overactive bladder, BPH , CVA with right sided weakness, multiple sclerosis, kidney stone, suprapubic catheter, history of penile implant, h/o hernia repair presents to the ED from Dr. Cedeno office for evaluation of gross hematuria for 9 days and increased weakness that he noticed this am. Patient s/p cystolithopaxy POD#9. States that he has been having hematuria since the procedure. He had 1 episode of vomiting this am, c/o back pain which is chronic. Denies fevers, chills abdominal pain,  nausea, headache, dysuria, CP, SOB.  (05 Jul 2024 17:09)         Prior hospital charts reviewed [Yes]  Primary team notes reviewed [Yes]  Other consultant notes reviewed [Yes]    REVIEW OF SYSTEMS:      PAST MEDICAL & SURGICAL HISTORY:  Atrial fibrillation      Overactive bladder      BPH (benign prostatic hyperplasia)      CVA (cerebrovascular accident)      Multiple sclerosis      Kidney stone      Suprapubic catheter      Prediabetes      Hypothyroid      History of penile implant      History of suprapubic catheter      H/O hernia repair  abdominal with mesh          SOCIAL HISTORY:  - Born in _____, migrated [place] in 19XX  - Currently working as / Retired  - Lives with _____; no pets  - No recent travel  - Denies tobacco use  - Denies alcohol use  - Denies illicit drug use  - Currently sexually active, has one male/female sexual partner    FAMILY HISTORY:      Allergies:  No Known Allergies      ANTIMICROBIALS:  cefepime   IVPB 1000 every 12 hours  cefepime   IVPB        ANTIMICROBIALS (past 90 days):  MEDICATIONS  (STANDING):  cefepime   IVPB   100 mL/Hr IV Intermittent (07-07-24 @ 06:05)   100 mL/Hr IV Intermittent (07-06-24 @ 18:10)    cefepime   IVPB   100 mL/Hr IV Intermittent (07-06-24 @ 10:52)        OTHER MEDS:   MEDICATIONS  (STANDING):  acetaminophen     Tablet .. 650 every 6 hours PRN  dextrose Oral Gel 15 once PRN  insulin lispro (ADMELOG) corrective regimen sliding scale  three times a day before meals  levothyroxine 112 daily  melatonin 3 at bedtime PRN  metoprolol succinate ER 25 daily  ondansetron Injectable 4 every 8 hours PRN  pantoprazole    Tablet 40 before breakfast  rOPINIRole 0.5 two times a day  simvastatin 20 at bedtime  traZODone 100 daily      VITALS:  Vital Signs Last 24 Hrs  T(F): 97.7 (07-07-24 @ 04:55), Max: 98.6 (07-05-24 @ 15:34)    Vital Signs Last 24 Hrs  HR: 87 (07-07-24 @ 04:55) (67 - 87)  BP: 102/65 (07-07-24 @ 04:55) (94/56 - 121/80)  RR: 18 (07-07-24 @ 04:55)  SpO2: 96% (07-07-24 @ 04:55) (96% - 96%)  Wt(kg): --    EXAM:    Labs:                        9.1    10.35 )-----------( 199      ( 06 Jul 2024 15:22 )             28.0     07-06    142  |  107  |  17  ----------------------------<  97  4.3   |  26  |  1.2    Ca    8.5      06 Jul 2024 07:00  Mg     2.1     07-06    TPro  6.3  /  Alb  3.6  /  TBili  0.3  /  DBili  x   /  AST  17  /  ALT  10  /  AlkPhos  54  07-05      WBC Trend:  WBC Count: 10.35 (07-06-24 @ 15:22)  WBC Count: 10.26 (07-06-24 @ 11:00)  WBC Count: 9.38 (07-06-24 @ 07:00)  WBC Count: 10.04 (07-05-24 @ 21:20)      Auto Neutrophil #: 7.20 K/uL (07-06-24 @ 15:22)  Auto Neutrophil #: 7.41 K/uL (07-06-24 @ 11:00)  Auto Neutrophil #: 6.67 K/uL (07-06-24 @ 07:00)  Auto Neutrophil #: 6.72 K/uL (07-05-24 @ 21:20)  Auto Neutrophil #: 9.64 K/uL (07-05-24 @ 13:40)      Creatine Trend:  Creatinine: 1.2 (07-06)  Creatinine: 1.6 (07-05)      Liver Biochemical Testing Trend:  Alanine Aminotransferase (ALT/SGPT): 10 (07-05)  Alanine Aminotransferase (ALT/SGPT): 14 (06-12)  Alanine Aminotransferase (ALT/SGPT): 15 (02-22)  Aspartate Aminotransferase (AST/SGOT): 17 (07-05-24 @ 13:40)  Aspartate Aminotransferase (AST/SGOT): 16 (06-12-24 @ 11:35)  Aspartate Aminotransferase (AST/SGOT): 19 (02-22-24 @ 09:00)  Bilirubin Total: 0.3 (07-05)  Bilirubin Total: 0.2 (06-12)  Bilirubin Total: 0.3 (02-22)      Trend LDH      Auto Eosinophil %: 2.5 % (07-06-24 @ 15:22)  Auto Eosinophil %: 2.2 % (07-06-24 @ 11:00)  Auto Eosinophil %: 2.3 % (07-06-24 @ 07:00)  Auto Eosinophil %: 2.1 % (07-05-24 @ 21:20)  Auto Eosinophil %: 1.2 % (07-05-24 @ 13:40)      Urinalysis Basic - ( 06 Jul 2024 07:00 )    Color: x / Appearance: x / SG: x / pH: x  Gluc: 97 mg/dL / Ketone: x  / Bili: x / Urobili: x   Blood: x / Protein: x / Nitrite: x   Leuk Esterase: x / RBC: x / WBC x   Sq Epi: x / Non Sq Epi: x / Bacteria: x          MICROBIOLOGY:        Urinalysis with Rflx Culture (collected 05 Jul 2024 17:50)      A1C with Estimated Average Glucose Result: 6.1 % (06-12-24 @ 12:08)  A1C with Estimated Average Glucose Result: 6.3 % (02-22-24 @ 09:27)        RADIOLOGY:  imaging below personally reviewed    < from: US Kidney and Bladder (07.05.24 @ 14:51) >  IMPRESSION:    Bilateral moderate hydronephrosis    Large heterogeneous structure within the bladder measuring 11.2 x 8.4 x   8.1 cm with possible vascular flow.    Differential includes hemorrhagic clots and/or bladder neoplasm.    Further evaluation is recommended to exclude the possibility of an   underlying bladder neoplasm.    Bladder volume is 807 cc    < end of copied text >   INFECTIOUS DISEASE CONSULT NOTE    Patient is a 69y old  Male who presents with a chief complaint of symptomatic anemia, gross hematuria (06 Jul 2024 12:30)    HPI:  This is a 68 y/o male with hx of atrial fibrillation (on Eliquis), overactive bladder, BPH , CVA with right sided weakness, multiple sclerosis, kidney stone, suprapubic catheter, history of penile implant, h/o hernia repair presents to the ED from Dr. Cedeno office for evaluation of gross hematuria for 9 days and increased weakness that he noticed this am. Patient s/p cystolithopaxy POD#9. States that he has been having hematuria since the procedure. He had 1 episode of vomiting this am, c/o back pain which is chronic. Denies fevers, chills abdominal pain,  nausea, headache, dysuria, CP, SOB.  (05 Jul 2024 17:09)     Prior hospital charts reviewed [Yes]  Primary team notes reviewed [Yes]  Other consultant notes reviewed [Yes]    REVIEW OF SYSTEMS:  CONSTITUTIONAL: No fever or chills  HEAD: No lesion on scalp  EYES: No visual disturbance  ENT: No sore throat  RESPIRATORY: No cough, no shortness of breath  CARDIOVASCULAR: No chest pain or palpitations  GASTROINTESTINAL: No abdominal or epigastric pain  GENITOURINARY: + SPC and + calvert; hematuria  NEUROLOGICAL: No headache/dizziness  MUSCULOSKELETAL: No joint pain, erythema, or swelling; no back pain  SKIN: No itching, rashes  All other ROS negative except noted above      PAST MEDICAL & SURGICAL HISTORY:  Atrial fibrillation      Overactive bladder      BPH (benign prostatic hyperplasia)      CVA (cerebrovascular accident)      Multiple sclerosis      Kidney stone      Suprapubic catheter      Prediabetes      Hypothyroid      History of penile implant      History of suprapubic catheter      H/O hernia repair  abdominal with mesh          SOCIAL HISTORY:  - No recent travel  - Denies tobacco use  - Denies alcohol use  - Denies illicit drug use    FAMILY HISTORY:  No family history of kidney stones    Allergies:  No Known Allergies      ANTIMICROBIALS:  cefepime   IVPB 1000 every 12 hours  cefepime   IVPB        ANTIMICROBIALS (past 90 days):  MEDICATIONS  (STANDING):  cefepime   IVPB   100 mL/Hr IV Intermittent (07-07-24 @ 06:05)   100 mL/Hr IV Intermittent (07-06-24 @ 18:10)    cefepime   IVPB   100 mL/Hr IV Intermittent (07-06-24 @ 10:52)        OTHER MEDS:   MEDICATIONS  (STANDING):  acetaminophen     Tablet .. 650 every 6 hours PRN  dextrose Oral Gel 15 once PRN  insulin lispro (ADMELOG) corrective regimen sliding scale  three times a day before meals  levothyroxine 112 daily  melatonin 3 at bedtime PRN  metoprolol succinate ER 25 daily  ondansetron Injectable 4 every 8 hours PRN  pantoprazole    Tablet 40 before breakfast  rOPINIRole 0.5 two times a day  simvastatin 20 at bedtime  traZODone 100 daily      VITALS:  Vital Signs Last 24 Hrs  T(F): 97.7 (07-07-24 @ 04:55), Max: 98.6 (07-05-24 @ 15:34)    Vital Signs Last 24 Hrs  HR: 87 (07-07-24 @ 04:55) (67 - 87)  BP: 102/65 (07-07-24 @ 04:55) (94/56 - 121/80)  RR: 18 (07-07-24 @ 04:55)  SpO2: 96% (07-07-24 @ 04:55) (96% - 96%)  Wt(kg): --    EXAM:  GENERAL: NAD, lying in bed  HEAD: No head lesions  EYES: Conjunctiva pink and cornea white  EAR, NOSE, MOUTH, THROAT: Normal external ears and nose, no discharges; moist mucous membranes  NECK: Supple, nontender to palpation; no JVD  RESPIRATORY: Clear to auscultation bilaterally  CARDIOVASCULAR: S1 S2  GASTROINTESTINAL: Soft, nontender, nondistended; normoactive bowel sounds  : + SPC, site is clean; + calvert, both draining hematuria  EXTREMITIES: No clubbing, cyanosis, or petal edema  NERVOUS SYSTEM: Alert and oriented to person, time, place and situation, speech clear. No focal deficits   MUSCULOSKELETAL: No joint erythema, swelling or pain  SKIN: No rashes or lesions, no superficial thrombophlebitis  PSYCH: Normal affect    Labs:                        9.1    10.35 )-----------( 199      ( 06 Jul 2024 15:22 )             28.0     07-06    142  |  107  |  17  ----------------------------<  97  4.3   |  26  |  1.2    Ca    8.5      06 Jul 2024 07:00  Mg     2.1     07-06    TPro  6.3  /  Alb  3.6  /  TBili  0.3  /  DBili  x   /  AST  17  /  ALT  10  /  AlkPhos  54  07-05      WBC Trend:  WBC Count: 10.35 (07-06-24 @ 15:22)  WBC Count: 10.26 (07-06-24 @ 11:00)  WBC Count: 9.38 (07-06-24 @ 07:00)  WBC Count: 10.04 (07-05-24 @ 21:20)      Auto Neutrophil #: 7.20 K/uL (07-06-24 @ 15:22)  Auto Neutrophil #: 7.41 K/uL (07-06-24 @ 11:00)  Auto Neutrophil #: 6.67 K/uL (07-06-24 @ 07:00)  Auto Neutrophil #: 6.72 K/uL (07-05-24 @ 21:20)  Auto Neutrophil #: 9.64 K/uL (07-05-24 @ 13:40)      Creatine Trend:  Creatinine: 1.2 (07-06)  Creatinine: 1.6 (07-05)      Liver Biochemical Testing Trend:  Alanine Aminotransferase (ALT/SGPT): 10 (07-05)  Alanine Aminotransferase (ALT/SGPT): 14 (06-12)  Alanine Aminotransferase (ALT/SGPT): 15 (02-22)  Aspartate Aminotransferase (AST/SGOT): 17 (07-05-24 @ 13:40)  Aspartate Aminotransferase (AST/SGOT): 16 (06-12-24 @ 11:35)  Aspartate Aminotransferase (AST/SGOT): 19 (02-22-24 @ 09:00)  Bilirubin Total: 0.3 (07-05)  Bilirubin Total: 0.2 (06-12)  Bilirubin Total: 0.3 (02-22)      Trend LDH      Auto Eosinophil %: 2.5 % (07-06-24 @ 15:22)  Auto Eosinophil %: 2.2 % (07-06-24 @ 11:00)  Auto Eosinophil %: 2.3 % (07-06-24 @ 07:00)  Auto Eosinophil %: 2.1 % (07-05-24 @ 21:20)  Auto Eosinophil %: 1.2 % (07-05-24 @ 13:40)      Urinalysis Basic - ( 06 Jul 2024 07:00 )    Color: x / Appearance: x / SG: x / pH: x  Gluc: 97 mg/dL / Ketone: x  / Bili: x / Urobili: x   Blood: x / Protein: x / Nitrite: x   Leuk Esterase: x / RBC: x / WBC x   Sq Epi: x / Non Sq Epi: x / Bacteria: x          MICROBIOLOGY:        Urinalysis with Rflx Culture (collected 05 Jul 2024 17:50)      A1C with Estimated Average Glucose Result: 6.1 % (06-12-24 @ 12:08)  A1C with Estimated Average Glucose Result: 6.3 % (02-22-24 @ 09:27)        RADIOLOGY:  imaging below personally reviewed    < from: US Kidney and Bladder (07.05.24 @ 14:51) >  IMPRESSION:    Bilateral moderate hydronephrosis    Large heterogeneous structure within the bladder measuring 11.2 x 8.4 x   8.1 cm with possible vascular flow.    Differential includes hemorrhagic clots and/or bladder neoplasm.    Further evaluation is recommended to exclude the possibility of an   underlying bladder neoplasm.    Bladder volume is 807 cc    < end of copied text >

## 2024-07-07 NOTE — PROGRESS NOTE ADULT - SUBJECTIVE AND OBJECTIVE BOX
SUBJECTIVE:    Patient is a 69y old Male who presents with a chief complaint of symptomatic anemia, gross hematuria (07 Jul 2024 10:13)    Currently admitted to medicine with the primary diagnosis of Symptomatic anemia       Today is hospital day 2d. This morning he is resting comfortably in bed and reports no new issues or overnight events.     ROS:   CONSTITUTIONAL: No weakness, fevers or chills   EYES/ENT: No visual changes; No vertigo or throat pain   NECK: No pain or stiffness   RESPIRATORY: No cough, wheezing, hemoptysis; No shortness of breath   CARDIOVASCULAR: No chest pain or palpitations   GASTROINTESTINAL: No abdominal or epigastric pain. No nausea, vomiting, or hematemesis; No diarrhea or constipation. No melena or hematochezia.  GENITOURINARY: No dysuria, frequency or hematuria  NEUROLOGICAL: No numbness or weakness  SKIN: No itching, rashes      PAST MEDICAL & SURGICAL HISTORY  Atrial fibrillation    Overactive bladder    BPH (benign prostatic hyperplasia)    CVA (cerebrovascular accident)    Multiple sclerosis    Kidney stone    Suprapubic catheter    Prediabetes    Hypothyroid    History of penile implant    History of suprapubic catheter    H/O hernia repair  abdominal with mesh      SOCIAL HISTORY:    ALLERGIES:  No Known Allergies    MEDICATIONS:  STANDING MEDICATIONS  chlorhexidine 2% Cloths 1 Application(s) Topical <User Schedule>  insulin lispro (ADMELOG) corrective regimen sliding scale   SubCutaneous three times a day before meals  lactated ringers. 1000 milliLiter(s) IV Continuous <Continuous>  levothyroxine 112 MICROGram(s) Oral daily  meropenem  IVPB      metoprolol succinate ER 25 milliGRAM(s) Oral daily  pantoprazole    Tablet 40 milliGRAM(s) Oral before breakfast  rOPINIRole 0.5 milliGRAM(s) Oral two times a day  simvastatin 20 milliGRAM(s) Oral at bedtime  traZODone 100 milliGRAM(s) Oral daily    PRN MEDICATIONS  acetaminophen     Tablet .. 650 milliGRAM(s) Oral every 6 hours PRN  dextrose Oral Gel 15 Gram(s) Oral once PRN  melatonin 3 milliGRAM(s) Oral at bedtime PRN  ondansetron Injectable 4 milliGRAM(s) IV Push every 8 hours PRN    VITALS:   T(F): 97.7  HR: 87  BP: 102/65  RR: 18  SpO2: 96%    LABS:  Negative for smoking/alcohol/drug use.                         9.1    9.81  )-----------( 214      ( 07 Jul 2024 07:03 )             28.7     07-07    139  |  104  |  15  ----------------------------<  107<H>  4.2   |  25  |  1.1    Ca    9.0      07 Jul 2024 07:03  Mg     2.1     07-06    TPro  6.1  /  Alb  3.4<L>  /  TBili  0.3  /  DBili  x   /  AST  14  /  ALT  8   /  AlkPhos  53  07-07    PT/INR - ( 06 Jul 2024 07:00 )   PT: 18.20 sec;   INR: 1.59 ratio         PTT - ( 06 Jul 2024 07:00 )  PTT:31.1 sec  Urinalysis Basic - ( 07 Jul 2024 07:03 )    Color: x / Appearance: x / SG: x / pH: x  Gluc: 107 mg/dL / Ketone: x  / Bili: x / Urobili: x   Blood: x / Protein: x / Nitrite: x   Leuk Esterase: x / RBC: x / WBC x   Sq Epi: x / Non Sq Epi: x / Bacteria: x            Urinalysis with Rflx Culture (collected 05 Jul 2024 17:50)          RADIOLOGY:    PHYSICAL EXAM:  GEN: No acute distress  HEENT: normocephalic, atraumatic, aniceteric  LUNGS: Clear to auscultation bilaterally, no rales/wheezing/ rhonchi  HEART: S1/S2 present. RRR, no murmurs  ABD: Soft, non-tender, non-distended. Bowel sounds present  EXT: no edema   NEURO: AAOX3, normal affect      ASSESSMENT AND PLAN:    68 y/o male with hx of atrial fibrillation (on Eliquis), overactive bladder, BPH, CVA with right sided weakness, multiple sclerosis, kidney stone, suprapubic catheter, history of penile implant, h/o hernia repair presents to the ED from Dr. Cedeno office for evaluation of gross hematuria for 9 days and increased weakness that he noticed this am. Patient s/p cystolithopaxy POD#9. States that he has been having hematuria since the procedure. He had 1 episode of vomiting this am, c/o back pain which is chronic. Denies fevers, chills abdominal pain, back pain, nausea, headache, dysuria, CP, SOB.     # Gross Hematuria sp cysto ~ 1 week ago - improving   # Symptomatic anemia sp 1 unit prbc in ED - improved   # Complicated UTI   # H/O Chronic Urinary retention sp Chronic SPC  # H/O BPH   - U/A + , fu urine clx   - RBUS noted w/ clots sp irrigation with urology   - Urology recs: Monitor for clot retention/CBI, bladder irrigation- flush with 100 cc q6 hours  - HOLD Eliquis - will need urology clearance to resume AC  - ID EVAL appreciated- Meropenem     #DANIEL - resolved   post obstructive  - rbus w/ clots and > 800 urine - sp SPC exchange and calvert placement in ED  - IVF LR   - hold lasix     #H/O Chronic A fib - rate controlled now   - chadsvasc 3   - HELD home Eliquis given active bleeding   - c/w metoprolol (dose lowered to 25 mg qd)    # H/O CVA - on eliquis at home / cw statin    #H/O Hypothyroidism-c/w synthroid     #H/O DM2 - hold home po medications ; insulin inpatient; carb diet     dvt/ gi ppx/diet  dispo: acute  handoff: fu urine clx / urology clearance for when AC can be resumed / PT EVAL

## 2024-07-07 NOTE — PROGRESS NOTE ADULT - ASSESSMENT
.  Impression:  69 y.o M w/ PMHx of Afib on Eliquis, Overactive bladder, BPH, CVA with right sided weakness, Multiple sclerosis, Kidney stone, Suprapubic catheter, History of penile implant, H/O hernia repair presents to the ED for evaluation of gross hematuria x 1 week and increased weakness that he noticed this am. Patient s/p Cystolitholapaxy POD#9. States that he has been having hematuria since the procedure. He had 1 episode of vomiting this am, c/o back pain which is chronic. Denies abdominal pain, nausea, headache, dysuria, CP, SOB.   # s/p Cystolitholapaxy - POD #11.  # Hematuria 2/2 Eliquis and recent Cysto and active UTI  # Anemia  # UTI        Plan:  # s/p Cystolitholapaxy - POD #11.  # Hematuria 2/2 Eliquis and recent Cysto and active UTI  # Anemia  # UTI     - Ivabx:  Pt on Cefepime for UTI.   - Monitor urine output from urethral Martinez and would clamp SPT while has Martinez and then unclamp once Urethral Martinez removed.    - Continue bladder irrigation q6h by nursing to evacuate any clots through urethral catheter.  Once urine clears, would remove urethral catheter and unclamp and continue with SP catheter.   - Renal bladder Sono showed large clot in bladder.  - Trend cbc -- H/H=8.1--> 9.1 today.   - Case discussed with Dr. Martin.     .  Impression:  69 y.o M w/ PMHx of Afib on Eliquis, Overactive bladder, BPH, CVA with right sided weakness, Multiple sclerosis, Kidney stone, Suprapubic catheter, History of penile implant, H/O hernia repair presents to the ED for evaluation of gross hematuria x 1 week and increased weakness that he noticed this am. Patient s/p Cystolitholapaxy POD#9. States that he has been having hematuria since the procedure. He had 1 episode of vomiting this am, c/o back pain which is chronic. Denies abdominal pain, nausea, headache, dysuria, CP, SOB.   # s/p Cystolitholapaxy - POD #11.  # Hematuria 2/2 Eliquis and recent Cysto and active UTI  # Anemia  # UTI        Plan:  # s/p Cystolitholapaxy - POD #11.  # Hematuria 2/2 Eliquis and recent Cysto and active UTI  # Anemia  # UTI     - Ivabx:  Pt on Cefepime for UTI.   - Patient has been getting Bladder irrigation q6h to evacuate any clots through urethral catheter.    - Renal bladder Sono showed large clot in bladder.  - Trend cbc -- H/H=8.1--> 9.1 today.   - Case discussed with Dr. Martin and states can discontinue Urethral Martinez catheter today since urine clearing and continue SPT drainage.  Also recommends to resume the anticoagulation tomorrow since still has some hematuria in SPT.     - I relayed Dr. Martin's recommendations to Dr. Fuentes.

## 2024-07-07 NOTE — CONSULT NOTE ADULT - ASSESSMENT
70 y/o male with hx of atrial fibrillation (on Eliquis), overactive bladder, BPH , CVA with right sided weakness, multiple sclerosis, kidney stone, suprapubic catheter, history of penile implant, h/o hernia repair presents to the ED from Dr. Cedeno office for evaluation of gross hematuria for 9 days and increased weakness that he noticed this am.    ID is consulted for hematuria  WBC 12.19 > 10.35  DANIEL 1.6 > 1.2  UA with mix hematuria and pyuria, UCx pending  Prior UCx 10/2023 grew Citrobacter freudii, MSSA, Pseudomonas aeruginosa, E. coli    US renal and bladder 7/5  Bilateral moderate hydronephrosis  Large heterogeneous structure within the bladder measuring 11.2 x 8.4 x  8.1 cm with possible vascular flow.  Differential includes hemorrhagic clots and/or bladder neoplasm.  Further evaluation is recommended to exclude the possibility of an  underlying bladder neoplasm.  Bladder volume is 807 cc      IMPRESSION:  Possible UTI  Hematuria  Bladder lesion (clots vs neoplasm)  Afib on Eliquis  Immunosuppression / Immunosenescence which could result in poor clinical outcome      RECOMMENDATIONS:  - D/C cefepime (Pseudomonas in previous UCx had high LENCHO to cefepime)  - Start IV meropenem 1g q8hrs   - Follow up UCx, added BCx  - Urology follow up  - Offloading and frequent position changes, aspiration precaution  - Trend WBC, fever curve, transaminases, creatinine daily      * THIS IS AN INCOMPLETE NOTE. FINAL RECOMMENDATION IS PENDING *     70 y/o male with hx of atrial fibrillation (on Eliquis), overactive bladder, BPH , CVA with right sided weakness, multiple sclerosis, kidney stone, suprapubic catheter, history of penile implant, h/o hernia repair presents to the ED from Dr. Cedeno office for evaluation of gross hematuria for 9 days and increased weakness that he noticed this am.    ID is consulted for hematuria  WBC 12.19 > 10.35  DANIEL 1.6 > 1.2  UA with mix hematuria and pyuria, UCx pending  Prior UCx 10/2023 grew Citrobacter freudii, MSSA, Pseudomonas aeruginosa, E. coli    US renal and bladder 7/5  Bilateral moderate hydronephrosis  Large heterogeneous structure within the bladder measuring 11.2 x 8.4 x  8.1 cm with possible vascular flow.  Differential includes hemorrhagic clots and/or bladder neoplasm.  Further evaluation is recommended to exclude the possibility of an  underlying bladder neoplasm.  Bladder volume is 807 cc      IMPRESSION:  Possible UTI  Hematuria  Bladder lesion (clots vs neoplasm)  Afib on Eliquis  Immunosuppression / Immunosenescence which could result in poor clinical outcome      RECOMMENDATIONS:  - D/C cefepime (Pseudomonas in previous UCx had high LENCHO to cefepime)  - Start IV meropenem 1g q8hrs   - Follow up UCx, d/c antibiotic if it comes back negative  - Urology follow up  - Offloading and frequent position changes, aspiration precaution  - Trend WBC, fever curve, transaminases, creatinine daily      Carolina Mckinley D.O.  Attending Physician  Division of Infectious Diseases  [facility]  Please contact me via [employer] Teams

## 2024-07-08 LAB
ANION GAP SERPL CALC-SCNC: 10 MMOL/L — SIGNIFICANT CHANGE UP (ref 7–14)
APTT BLD: 27.2 SEC — SIGNIFICANT CHANGE UP (ref 27–39.2)
BASOPHILS # BLD AUTO: 0.04 K/UL — SIGNIFICANT CHANGE UP (ref 0–0.2)
BASOPHILS NFR BLD AUTO: 0.4 % — SIGNIFICANT CHANGE UP (ref 0–1)
BUN SERPL-MCNC: 16 MG/DL — SIGNIFICANT CHANGE UP (ref 10–20)
CALCIUM SERPL-MCNC: 9.1 MG/DL — SIGNIFICANT CHANGE UP (ref 8.4–10.5)
CHLORIDE SERPL-SCNC: 102 MMOL/L — SIGNIFICANT CHANGE UP (ref 98–110)
CO2 SERPL-SCNC: 28 MMOL/L — SIGNIFICANT CHANGE UP (ref 17–32)
CREAT SERPL-MCNC: 1.2 MG/DL — SIGNIFICANT CHANGE UP (ref 0.7–1.5)
EGFR: 65 ML/MIN/1.73M2 — SIGNIFICANT CHANGE UP
EOSINOPHIL # BLD AUTO: 0.33 K/UL — SIGNIFICANT CHANGE UP (ref 0–0.7)
EOSINOPHIL NFR BLD AUTO: 3.5 % — SIGNIFICANT CHANGE UP (ref 0–8)
GLUCOSE BLDC GLUCOMTR-MCNC: 113 MG/DL — HIGH (ref 70–99)
GLUCOSE BLDC GLUCOMTR-MCNC: 115 MG/DL — HIGH (ref 70–99)
GLUCOSE BLDC GLUCOMTR-MCNC: 120 MG/DL — HIGH (ref 70–99)
GLUCOSE BLDC GLUCOMTR-MCNC: 161 MG/DL — HIGH (ref 70–99)
GLUCOSE SERPL-MCNC: 98 MG/DL — SIGNIFICANT CHANGE UP (ref 70–99)
HCT VFR BLD CALC: 28.4 % — LOW (ref 42–52)
HGB BLD-MCNC: 9.2 G/DL — LOW (ref 14–18)
IMM GRANULOCYTES NFR BLD AUTO: 1.9 % — HIGH (ref 0.1–0.3)
INR BLD: 1.23 RATIO — SIGNIFICANT CHANGE UP (ref 0.65–1.3)
LYMPHOCYTES # BLD AUTO: 2.05 K/UL — SIGNIFICANT CHANGE UP (ref 1.2–3.4)
LYMPHOCYTES # BLD AUTO: 21.8 % — SIGNIFICANT CHANGE UP (ref 20.5–51.1)
MCHC RBC-ENTMCNC: 25.9 PG — LOW (ref 27–31)
MCHC RBC-ENTMCNC: 32.4 G/DL — SIGNIFICANT CHANGE UP (ref 32–37)
MCV RBC AUTO: 80 FL — SIGNIFICANT CHANGE UP (ref 80–94)
MONOCYTES # BLD AUTO: 0.75 K/UL — HIGH (ref 0.1–0.6)
MONOCYTES NFR BLD AUTO: 8 % — SIGNIFICANT CHANGE UP (ref 1.7–9.3)
NEUTROPHILS # BLD AUTO: 6.07 K/UL — SIGNIFICANT CHANGE UP (ref 1.4–6.5)
NEUTROPHILS NFR BLD AUTO: 64.4 % — SIGNIFICANT CHANGE UP (ref 42.2–75.2)
NRBC # BLD: 0 /100 WBCS — SIGNIFICANT CHANGE UP (ref 0–0)
PLATELET # BLD AUTO: 200 K/UL — SIGNIFICANT CHANGE UP (ref 130–400)
PMV BLD: 11.3 FL — HIGH (ref 7.4–10.4)
POTASSIUM SERPL-MCNC: 4.1 MMOL/L — SIGNIFICANT CHANGE UP (ref 3.5–5)
POTASSIUM SERPL-SCNC: 4.1 MMOL/L — SIGNIFICANT CHANGE UP (ref 3.5–5)
PROTHROM AB SERPL-ACNC: 14 SEC — HIGH (ref 9.95–12.87)
RBC # BLD: 3.55 M/UL — LOW (ref 4.7–6.1)
RBC # FLD: 18.7 % — HIGH (ref 11.5–14.5)
SODIUM SERPL-SCNC: 140 MMOL/L — SIGNIFICANT CHANGE UP (ref 135–146)
WBC # BLD: 9.42 K/UL — SIGNIFICANT CHANGE UP (ref 4.8–10.8)
WBC # FLD AUTO: 9.42 K/UL — SIGNIFICANT CHANGE UP (ref 4.8–10.8)

## 2024-07-08 PROCEDURE — 99233 SBSQ HOSP IP/OBS HIGH 50: CPT

## 2024-07-08 PROCEDURE — 99232 SBSQ HOSP IP/OBS MODERATE 35: CPT

## 2024-07-08 RX ORDER — ENOXAPARIN SODIUM 100 MG/ML
80 INJECTION SUBCUTANEOUS EVERY 12 HOURS
Refills: 0 | Status: DISCONTINUED | OUTPATIENT
Start: 2024-07-08 | End: 2024-07-09

## 2024-07-08 RX ORDER — SENNOSIDES 8.6 MG
2 TABLET ORAL AT BEDTIME
Refills: 0 | Status: DISCONTINUED | OUTPATIENT
Start: 2024-07-08 | End: 2024-07-11

## 2024-07-08 RX ADMIN — Medication 25 MILLIGRAM(S): at 05:07

## 2024-07-08 RX ADMIN — MEROPENEM 100 MILLIGRAM(S): 500 INJECTION, POWDER, FOR SOLUTION INTRAVENOUS at 05:07

## 2024-07-08 RX ADMIN — Medication 2 TABLET(S): at 21:02

## 2024-07-08 RX ADMIN — ENOXAPARIN SODIUM 80 MILLIGRAM(S): 100 INJECTION SUBCUTANEOUS at 18:17

## 2024-07-08 RX ADMIN — MEROPENEM 100 MILLIGRAM(S): 500 INJECTION, POWDER, FOR SOLUTION INTRAVENOUS at 13:21

## 2024-07-08 RX ADMIN — PANTOPRAZOLE SODIUM 40 MILLIGRAM(S): 40 INJECTION, POWDER, FOR SOLUTION INTRAVENOUS at 05:07

## 2024-07-08 RX ADMIN — MEROPENEM 100 MILLIGRAM(S): 500 INJECTION, POWDER, FOR SOLUTION INTRAVENOUS at 21:02

## 2024-07-08 RX ADMIN — Medication 1 APPLICATION(S): at 05:07

## 2024-07-08 RX ADMIN — TRAZODONE HYDROCHLORIDE 100 MILLIGRAM(S): 50 TABLET, FILM COATED ORAL at 21:02

## 2024-07-08 RX ADMIN — ROPINIROLE HYDROCHLORIDE 0.5 MILLIGRAM(S): 0.5 TABLET, FILM COATED ORAL at 05:06

## 2024-07-08 RX ADMIN — Medication 112 MICROGRAM(S): at 05:07

## 2024-07-08 RX ADMIN — Medication 20 MILLIGRAM(S): at 21:02

## 2024-07-08 RX ADMIN — ROPINIROLE HYDROCHLORIDE 0.5 MILLIGRAM(S): 0.5 TABLET, FILM COATED ORAL at 18:32

## 2024-07-08 NOTE — PROGRESS NOTE ADULT - SUBJECTIVE AND OBJECTIVE BOX
SUBJECTIVE:    Patient is a 69y old Male who presents with a chief complaint of symptomatic anemia, gross hematuria (08 Jul 2024 09:02)    Currently admitted to medicine with the primary diagnosis of Symptomatic anemia       Today is hospital day 3d. This morning he is resting comfortably in bed and reports no new issues or overnight events.     ROS:   CONSTITUTIONAL: No weakness, fevers or chills   EYES/ENT: No visual changes; No vertigo or throat pain   NECK: No pain or stiffness   RESPIRATORY: No cough, wheezing, hemoptysis; No shortness of breath   CARDIOVASCULAR: No chest pain or palpitations   GASTROINTESTINAL: No abdominal or epigastric pain. No nausea, vomiting, or hematemesis; No diarrhea or constipation. No melena or hematochezia.  GENITOURINARY: No dysuria, frequency or hematuria  NEUROLOGICAL: No numbness or weakness  SKIN: No itching, rashes      PAST MEDICAL & SURGICAL HISTORY  Atrial fibrillation    Overactive bladder    BPH (benign prostatic hyperplasia)    CVA (cerebrovascular accident)    Multiple sclerosis    Kidney stone    Suprapubic catheter    Prediabetes    Hypothyroid    History of penile implant    History of suprapubic catheter    H/O hernia repair  abdominal with mesh      SOCIAL HISTORY:    ALLERGIES:  No Known Allergies    MEDICATIONS:  STANDING MEDICATIONS  chlorhexidine 2% Cloths 1 Application(s) Topical <User Schedule>  insulin lispro (ADMELOG) corrective regimen sliding scale   SubCutaneous three times a day before meals  lactated ringers. 1000 milliLiter(s) IV Continuous <Continuous>  levothyroxine 112 MICROGram(s) Oral daily  meropenem  IVPB      meropenem  IVPB 1000 milliGRAM(s) IV Intermittent every 8 hours  metoprolol succinate ER 25 milliGRAM(s) Oral daily  pantoprazole    Tablet 40 milliGRAM(s) Oral before breakfast  rOPINIRole 0.5 milliGRAM(s) Oral two times a day  senna 2 Tablet(s) Oral at bedtime  simvastatin 20 milliGRAM(s) Oral at bedtime  traZODone 100 milliGRAM(s) Oral at bedtime    PRN MEDICATIONS  acetaminophen     Tablet .. 650 milliGRAM(s) Oral every 6 hours PRN  dextrose Oral Gel 15 Gram(s) Oral once PRN  melatonin 3 milliGRAM(s) Oral at bedtime PRN  ondansetron Injectable 4 milliGRAM(s) IV Push every 8 hours PRN    VITALS:   T(F): 97.8  HR: 81  BP: 148/98  RR: 18  SpO2: 97%    LABS:  Negative for smoking/alcohol/drug use.                         9.2    9.42  )-----------( 200      ( 08 Jul 2024 07:09 )             28.4     07-08    140  |  102  |  16  ----------------------------<  98  4.1   |  28  |  1.2    Ca    9.1      08 Jul 2024 07:09    TPro  6.1  /  Alb  3.4<L>  /  TBili  0.3  /  DBili  x   /  AST  14  /  ALT  8   /  AlkPhos  53  07-07    PT/INR - ( 08 Jul 2024 07:09 )   PT: 14.00 sec;   INR: 1.23 ratio         PTT - ( 08 Jul 2024 07:09 )  PTT:27.2 sec  Urinalysis Basic - ( 08 Jul 2024 07:09 )    Color: x / Appearance: x / SG: x / pH: x  Gluc: 98 mg/dL / Ketone: x  / Bili: x / Urobili: x   Blood: x / Protein: x / Nitrite: x   Leuk Esterase: x / RBC: x / WBC x   Sq Epi: x / Non Sq Epi: x / Bacteria: x            Urinalysis with Rflx Culture (collected 05 Jul 2024 17:50)          RADIOLOGY:    PHYSICAL EXAM:  GEN: No acute distress  HEENT: normocephalic, atraumatic, aniceteric  LUNGS: Clear to auscultation bilaterally, no rales/wheezing/ rhonchi  HEART: S1/S2 present. RRR, no murmurs  ABD: Soft, non-tender, non-distended. Bowel sounds present  EXT: no edema   NEURO: AAOX3, normal affect      ASSESSMENT AND PLAN:    68 y/o male with hx of atrial fibrillation (on Eliquis), overactive bladder, BPH, CVA with right sided weakness, multiple sclerosis, kidney stone, suprapubic catheter, history of penile implant, h/o hernia repair presents to the ED from Dr. Cedeno office for evaluation of gross hematuria for 9 days and increased weakness that he noticed this am. Patient s/p cystolithopaxy POD#9. States that he has been having hematuria since the procedure. He had 1 episode of vomiting this am, c/o back pain which is chronic. Denies fevers, chills abdominal pain, back pain, nausea, headache, dysuria, CP, SOB.     # Gross Hematuria sp cysto ~ 1 week ago - improving   # Symptomatic anemia sp 1 unit prbc in ED - improved   # Complicated UTI   # H/O Chronic Urinary retention sp Chronic SPC  # H/O BPH   - U/A + , fu urine clx   - RBUS noted w/ clots sp irrigation with urology   - Urology recs: Monitor for clot retention/CBI, bladder irrigation- flush with 100 cc q6 hours  - Indwelling urinary catheter removed 7/7  - HELD Eliquis - urology cleared the patient to resume AC today   - ID EVAL appreciated- Meropenem     #DANIEL - resolved   post obstructive  - rbus w/ clots and > 800 urine - sp SPC exchange and calvert placement in ED  - sp IVF LR   - held lasix     #H/O Chronic A fib - rate controlled now   - chadsvasc 3   - HELD home Eliquis given active bleeding - urology cleared to resume - will give lovenox subq 1mg/kg bid for now   - c/w metoprolol (dose lowered to 25 mg qd)    # H/O CVA - on eliquis at home / cw statin    #H/O Hypothyroidism-c/w synthroid     #H/O DM2 - hold home po medications ; insulin inpatient; carb diet     dvt/ gi ppx/diet  dispo: acute (home pt)   handoff: fu urine clx  / resumed ac - monitor Hb

## 2024-07-08 NOTE — PROGRESS NOTE ADULT - SUBJECTIVE AND OBJECTIVE BOX
Patient seen and examined at bedside, resting comfortably.  No complaints.  Small amount of clots in SPT collecting bag, no active hematuria.    Vital Signs Last 24 Hrs  T(C): 36.6 (08 Jul 2024 05:05), Max: 36.8 (07 Jul 2024 20:55)  T(F): 97.8 (08 Jul 2024 05:05), Max: 98.2 (07 Jul 2024 20:55)  HR: 80 (08 Jul 2024 05:05) (80 - 84)  BP: 135/81 (08 Jul 2024 05:05) (100/65 - 135/81)  RR: 18 (08 Jul 2024 05:05) (18 - 18)  SpO2: 98% (08 Jul 2024 05:05) (96% - 98%)    Parameters below as of 08 Jul 2024 05:05  Patient On (Oxygen Delivery Method): room air    General Appearance: NAD  Abdomen: +SPT with yellow urine and 2 small clots in collection bag  CNS: A/O x 3    I&O's Summary    07 Jul 2024 07:01  -  08 Jul 2024 07:00  --------------------------------------------------------  IN: 0 mL / OUT: 1450 mL / NET: -1450 mL      I&O's Detail    07 Jul 2024 07:01  -  08 Jul 2024 07:00  --------------------------------------------------------  IN:  Total IN: 0 mL    OUT:    Indwelling Catheter - Suprapubic (mL): 1050 mL    Indwelling Catheter - Urethral (mL): 400 mL  Total OUT: 1450 mL    Total NET: -1450 mL          MEDICATIONS  (STANDING):  chlorhexidine 2% Cloths 1 Application(s) Topical <User Schedule>  insulin lispro (ADMELOG) corrective regimen sliding scale   SubCutaneous three times a day before meals  lactated ringers. 1000 milliLiter(s) (100 mL/Hr) IV Continuous <Continuous>  levothyroxine 112 MICROGram(s) Oral daily  meropenem  IVPB      meropenem  IVPB 1000 milliGRAM(s) IV Intermittent every 8 hours  metoprolol succinate ER 25 milliGRAM(s) Oral daily  pantoprazole    Tablet 40 milliGRAM(s) Oral before breakfast  rOPINIRole 0.5 milliGRAM(s) Oral two times a day  simvastatin 20 milliGRAM(s) Oral at bedtime  traZODone 100 milliGRAM(s) Oral at bedtime    MEDICATIONS  (PRN):  acetaminophen     Tablet .. 650 milliGRAM(s) Oral every 6 hours PRN Temp greater or equal to 38C (100.4F), Mild Pain (1 - 3)  dextrose Oral Gel 15 Gram(s) Oral once PRN Blood Glucose LESS THAN 70 milliGRAM(s)/deciliter  melatonin 3 milliGRAM(s) Oral at bedtime PRN Insomnia  ondansetron Injectable 4 milliGRAM(s) IV Push every 8 hours PRN Nausea and/or Vomiting      LABS:                        9.2    9.42  )-----------( 200      ( 08 Jul 2024 07:09 )             28.4     07-08    140  |  102  |  16  ----------------------------<  98  4.1   |  28  |  1.2    Ca    9.1      08 Jul 2024 07:09    TPro  6.1  /  Alb  3.4<L>  /  TBili  0.3  /  DBili  x   /  AST  14  /  ALT  8   /  AlkPhos  53  07-07    PT/INR - ( 08 Jul 2024 07:09 )   PT: 14.00 sec;   INR: 1.23 ratio         PTT - ( 08 Jul 2024 07:09 )  PTT:27.2 sec  Urinalysis Basic - ( 08 Jul 2024 07:09 )        Color: x / Appearance: x / SG: x / pH: x  Gluc: 98 mg/dL / Ketone: x  / Bili: x / Urobili: x   Blood: x / Protein: x / Nitrite: x   Leuk Esterase: x / RBC: x / WBC x   Sq Epi: x / Non Sq Epi: x / Bacteria: x        RADIOLOGY & ADDITIONAL STUDIES: Reviewed

## 2024-07-08 NOTE — PROGRESS NOTE ADULT - NS ATTEND AMEND GEN_ALL_CORE FT
pt seen ,examined  at bedside   - SPC = clear   -stable for discharge
Patient seen and examined at bedside with PA.   Martinez draining dark blood tinged urine, c/w old products in bladder.  Patient responded appropriately to his 1uprbc, Hgb from 7 to 8.1.   C/w with serial manual irrigations through urethral catheter. Once urine clears would remove urethral catheter and continue with SP catheter.   Pt otherwise in NAD with no new complaints resting comfortably.   Fu urine culture
Pt seen and examined at bedside with PA.   Martinez draining philomena tinged urine. He has not had any episodes of clot retention in over the past 24hrs.   He has no new acute complaints.  H/H stable/  Given his resolved hematuria and evacuation of his bladder blood clot we can remove his urethral catheter and maintain his SPT to drainage.

## 2024-07-08 NOTE — PROGRESS NOTE ADULT - ASSESSMENT
.  Impression:  69 y.o M w/ PMHx of Afib on Eliquis, Overactive bladder, BPH, CVA with right sided weakness, Multiple sclerosis, Kidney stone, Suprapubic catheter, History of penile implant, H/O hernia repair presents to the ED for evaluation of gross hematuria x 1 week and increased weakness that he noticed this am. Patient s/p Cystolitholapaxy POD#9. States that he has been having hematuria since the procedure. He had 1 episode of vomiting this am, c/o back pain which is chronic. Denies abdominal pain, nausea, headache, dysuria, CP, SOB.   # s/p Cystolitholapaxy - POD #12  # Hematuria 2/2 Eliquis and recent Cysto and active UTI  # Anemia  # UTI        Plan:  # s/p Cystolitholapaxy - POD #12  # Hematuria 2/2 Eliquis and recent Cysto and active UTI  # Anemia  # UTI     - Ivabx:  Pt on Adam for UTI. FU UCx  - s/p Bladder irrigation q6h to evacuate any clots through urethral catheter.    - Renal bladder Sono showed large clot in bladder.  - Trend cbc -- H/H=8.1--> 9.1 --> 9.2   - discontinued Urethral Martinez catheter 7/7/24.    - as recommended previously, would restart AC today and monitor  - will follow

## 2024-07-08 NOTE — CHART NOTE - NSCHARTNOTEFT_GEN_A_CORE
Hematuria with clots noted, will ask urology to follow up Hematuria with clots noted, will ask urology to follow up (spoke to urology PA) Hematuria with clots noted in suprapubic bag, will ask urology to follow up (spoke to urology PA)

## 2024-07-09 LAB
-  GENTAMICIN: SIGNIFICANT CHANGE UP
-  OXACILLIN: SIGNIFICANT CHANGE UP
-  PENICILLIN: SIGNIFICANT CHANGE UP
-  RIFAMPIN: SIGNIFICANT CHANGE UP
-  TETRACYCLINE: SIGNIFICANT CHANGE UP
-  TRIMETHOPRIM/SULFAMETHOXAZOLE: SIGNIFICANT CHANGE UP
-  VANCOMYCIN: SIGNIFICANT CHANGE UP
ANION GAP SERPL CALC-SCNC: 10 MMOL/L — SIGNIFICANT CHANGE UP (ref 7–14)
BASOPHILS # BLD AUTO: 0.04 K/UL — SIGNIFICANT CHANGE UP (ref 0–0.2)
BASOPHILS NFR BLD AUTO: 0.4 % — SIGNIFICANT CHANGE UP (ref 0–1)
BLD GP AB SCN SERPL QL: SIGNIFICANT CHANGE UP
BUN SERPL-MCNC: 15 MG/DL — SIGNIFICANT CHANGE UP (ref 10–20)
CALCIUM SERPL-MCNC: 9.1 MG/DL — SIGNIFICANT CHANGE UP (ref 8.4–10.5)
CHLORIDE SERPL-SCNC: 101 MMOL/L — SIGNIFICANT CHANGE UP (ref 98–110)
CO2 SERPL-SCNC: 30 MMOL/L — SIGNIFICANT CHANGE UP (ref 17–32)
CREAT SERPL-MCNC: 1.2 MG/DL — SIGNIFICANT CHANGE UP (ref 0.7–1.5)
CULTURE RESULTS: ABNORMAL
EGFR: 65 ML/MIN/1.73M2 — SIGNIFICANT CHANGE UP
EOSINOPHIL # BLD AUTO: 0.26 K/UL — SIGNIFICANT CHANGE UP (ref 0–0.7)
EOSINOPHIL NFR BLD AUTO: 2.4 % — SIGNIFICANT CHANGE UP (ref 0–8)
GLUCOSE BLDC GLUCOMTR-MCNC: 101 MG/DL — HIGH (ref 70–99)
GLUCOSE BLDC GLUCOMTR-MCNC: 138 MG/DL — HIGH (ref 70–99)
GLUCOSE BLDC GLUCOMTR-MCNC: 142 MG/DL — HIGH (ref 70–99)
GLUCOSE BLDC GLUCOMTR-MCNC: 97 MG/DL — SIGNIFICANT CHANGE UP (ref 70–99)
GLUCOSE SERPL-MCNC: 100 MG/DL — HIGH (ref 70–99)
HCT VFR BLD CALC: 31.4 % — LOW (ref 42–52)
HGB BLD-MCNC: 10 G/DL — LOW (ref 14–18)
IMM GRANULOCYTES NFR BLD AUTO: 1.6 % — HIGH (ref 0.1–0.3)
LYMPHOCYTES # BLD AUTO: 1.67 K/UL — SIGNIFICANT CHANGE UP (ref 1.2–3.4)
LYMPHOCYTES # BLD AUTO: 15.6 % — LOW (ref 20.5–51.1)
MCHC RBC-ENTMCNC: 25.9 PG — LOW (ref 27–31)
MCHC RBC-ENTMCNC: 31.8 G/DL — LOW (ref 32–37)
MCV RBC AUTO: 81.3 FL — SIGNIFICANT CHANGE UP (ref 80–94)
METHOD TYPE: SIGNIFICANT CHANGE UP
MONOCYTES # BLD AUTO: 0.72 K/UL — HIGH (ref 0.1–0.6)
MONOCYTES NFR BLD AUTO: 6.7 % — SIGNIFICANT CHANGE UP (ref 1.7–9.3)
NEUTROPHILS # BLD AUTO: 7.85 K/UL — HIGH (ref 1.4–6.5)
NEUTROPHILS NFR BLD AUTO: 73.3 % — SIGNIFICANT CHANGE UP (ref 42.2–75.2)
NRBC # BLD: 0 /100 WBCS — SIGNIFICANT CHANGE UP (ref 0–0)
ORGANISM # SPEC MICROSCOPIC CNT: ABNORMAL
ORGANISM # SPEC MICROSCOPIC CNT: SIGNIFICANT CHANGE UP
PLATELET # BLD AUTO: 229 K/UL — SIGNIFICANT CHANGE UP (ref 130–400)
PMV BLD: 10.7 FL — HIGH (ref 7.4–10.4)
POTASSIUM SERPL-MCNC: 4 MMOL/L — SIGNIFICANT CHANGE UP (ref 3.5–5)
POTASSIUM SERPL-SCNC: 4 MMOL/L — SIGNIFICANT CHANGE UP (ref 3.5–5)
RBC # BLD: 3.86 M/UL — LOW (ref 4.7–6.1)
RBC # FLD: 18.9 % — HIGH (ref 11.5–14.5)
SODIUM SERPL-SCNC: 141 MMOL/L — SIGNIFICANT CHANGE UP (ref 135–146)
SPECIMEN SOURCE: SIGNIFICANT CHANGE UP
WBC # BLD: 10.71 K/UL — SIGNIFICANT CHANGE UP (ref 4.8–10.8)
WBC # FLD AUTO: 10.71 K/UL — SIGNIFICANT CHANGE UP (ref 4.8–10.8)

## 2024-07-09 PROCEDURE — 99233 SBSQ HOSP IP/OBS HIGH 50: CPT

## 2024-07-09 RX ORDER — APIXABAN 5 MG/1
5 TABLET, FILM COATED ORAL EVERY 12 HOURS
Refills: 0 | Status: DISCONTINUED | OUTPATIENT
Start: 2024-07-09 | End: 2024-07-11

## 2024-07-09 RX ADMIN — MEROPENEM 100 MILLIGRAM(S): 500 INJECTION, POWDER, FOR SOLUTION INTRAVENOUS at 13:23

## 2024-07-09 RX ADMIN — Medication 25 MILLIGRAM(S): at 05:01

## 2024-07-09 RX ADMIN — TRAZODONE HYDROCHLORIDE 100 MILLIGRAM(S): 50 TABLET, FILM COATED ORAL at 21:51

## 2024-07-09 RX ADMIN — Medication 20 MILLIGRAM(S): at 21:51

## 2024-07-09 RX ADMIN — MEROPENEM 100 MILLIGRAM(S): 500 INJECTION, POWDER, FOR SOLUTION INTRAVENOUS at 05:01

## 2024-07-09 RX ADMIN — ROPINIROLE HYDROCHLORIDE 0.5 MILLIGRAM(S): 0.5 TABLET, FILM COATED ORAL at 17:04

## 2024-07-09 RX ADMIN — Medication 1 APPLICATION(S): at 05:02

## 2024-07-09 RX ADMIN — ROPINIROLE HYDROCHLORIDE 0.5 MILLIGRAM(S): 0.5 TABLET, FILM COATED ORAL at 05:01

## 2024-07-09 RX ADMIN — MEROPENEM 100 MILLIGRAM(S): 500 INJECTION, POWDER, FOR SOLUTION INTRAVENOUS at 21:51

## 2024-07-09 RX ADMIN — ENOXAPARIN SODIUM 80 MILLIGRAM(S): 100 INJECTION SUBCUTANEOUS at 05:00

## 2024-07-09 RX ADMIN — Medication 112 MICROGRAM(S): at 05:01

## 2024-07-09 RX ADMIN — PANTOPRAZOLE SODIUM 40 MILLIGRAM(S): 40 INJECTION, POWDER, FOR SOLUTION INTRAVENOUS at 05:01

## 2024-07-09 RX ADMIN — APIXABAN 5 MILLIGRAM(S): 5 TABLET, FILM COATED ORAL at 17:04

## 2024-07-09 NOTE — CDI QUERY NOTE - NSCDIOTHERTXTBX_GEN_ALL_CORE_HH
Clinical documentation and/or evidence in the medical record indicates that this patient has sepsis.  In order to ensure accurate coding and accuracy of the clinical record, the documentation in this patient’s medical record requires additional clarification.      Please include more specific documentation as to the type/status of sepsis in your progress note and/or discharge summary.   Please clarify if elevated HR and WBC can be further specified as:    •	Elevated HR and WBC associated with sepsis due to UTI  •	Elevated HR and WBC associated with other (please specify  •	Elevated HR and WBC are clinically insignificant  •	Other (specify)  Supporting documentation and/or clinical evidence:   7/6 Progress Note Adult-Urology Physician Assistant/Attending: … Hematuria 2/2 Eliquis and recent Cysto and active UTI …    Labs:  7/5 WBC 12.19  7/5 WBC 10.04, UCx 50,000-99,000 cfu/mL staph aureus  7/6 WBC 9.38    VS:  6/5     Meds:  7/5 NS 0.9% 1L IVB x1 dose  7/6-7/7 Maxipime 1g IVPB x3 doses  7/7 LR IV @ 100 mL/hr  7/7-7/9 Merrem 1g IVPB x7 doses    Thank you,  Mireya Angel RN, BSN  [phone]

## 2024-07-09 NOTE — PROGRESS NOTE ADULT - SUBJECTIVE AND OBJECTIVE BOX
INFECTIOUS DISEASE FOLLOW UP NOTE:    Interval History/ROS: Patient is a 69y old  Male who presents with a chief complaint of symptomatic anemia, gross hematuria (09 Jul 2024 13:12)      Overnight events: No overnight event. Martinez removed now.    REVIEW OF SYSTEMS:  CONSTITUTIONAL: No fever or chills  HEAD: No lesion on scalp  EYES: No visual disturbance  ENT: No sore throat  RESPIRATORY: No cough, no shortness of breath  CARDIOVASCULAR: No chest pain or palpitations  GASTROINTESTINAL: No abdominal or epigastric pain  GENITOURINARY: + SPC, no more hematuria  NEUROLOGICAL: No headache/dizziness  MUSCULOSKELETAL: No joint pain, erythema, or swelling; no back pain  SKIN: No itching, rashes  All other ROS negative except noted above        Prior hospital charts reviewed [Yes]  Primary team notes reviewed [Yes]  Other consultant notes reviewed [Yes]    Allergies:  No Known Allergies      ANTIMICROBIALS:   meropenem  IVPB    meropenem  IVPB 1000 every 8 hours      OTHER MEDS: MEDICATIONS  (STANDING):  acetaminophen     Tablet .. 650 every 6 hours PRN  apixaban 5 every 12 hours  dextrose Oral Gel 15 once PRN  insulin lispro (ADMELOG) corrective regimen sliding scale  three times a day before meals  levothyroxine 112 daily  melatonin 3 at bedtime PRN  metoprolol succinate ER 25 daily  ondansetron Injectable 4 every 8 hours PRN  pantoprazole    Tablet 40 before breakfast  rOPINIRole 0.5 two times a day  senna 2 at bedtime  simvastatin 20 at bedtime  traZODone 100 at bedtime      Vital Signs Last 24 Hrs  T(F): 98.2 (07-09-24 @ 21:00), Max: 98.6 (07-05-24 @ 15:34)    Vital Signs Last 24 Hrs  HR: 73 (07-09-24 @ 21:00) (73 - 96)  BP: 134/86 (07-09-24 @ 21:00) (133/94 - 152/89)  RR: 18 (07-09-24 @ 21:00)  SpO2: 100% (07-09-24 @ 21:00) (98% - 100%)  Wt(kg): --    EXAM:  GENERAL: NAD, lying in bed  HEAD: No head lesions  EYES: Conjunctiva pink and cornea white  EAR, NOSE, MOUTH, THROAT: Normal external ears and nose, no discharges; moist mucous membranes  NECK: Supple, nontender to palpation; no JVD  RESPIRATORY: Clear to auscultation bilaterally  CARDIOVASCULAR: S1 S2  GASTROINTESTINAL: Soft, nontender, nondistended; normoactive bowel sounds  : + SPC, site is clean; draining clear yellow urine  EXTREMITIES: No clubbing, cyanosis, or petal edema  NERVOUS SYSTEM: Alert and oriented to person, time, place and situation, speech clear. No focal deficits   MUSCULOSKELETAL: No joint erythema, swelling or pain  SKIN: No rashes or lesions, no superficial thrombophlebitis  PSYCH: Normal affect    Labs:                        10.0   10.71 )-----------( 229      ( 09 Jul 2024 07:10 )             31.4     07-09    141  |  101  |  15  ----------------------------<  100<H>  4.0   |  30  |  1.2    Ca    9.1      09 Jul 2024 07:10        WBC Trend:  WBC Count: 10.71 (07-09-24 @ 07:10)  WBC Count: 9.42 (07-08-24 @ 07:09)  WBC Count: 9.81 (07-07-24 @ 07:03)  WBC Count: 10.35 (07-06-24 @ 15:22)      Creatine Trend:  Creatinine: 1.2 (07-09)  Creatinine: 1.2 (07-08)  Creatinine: 1.1 (07-07)  Creatinine: 1.2 (07-06)      Liver Biochemical Testing Trend:  Alanine Aminotransferase (ALT/SGPT): 8 (07-07)  Alanine Aminotransferase (ALT/SGPT): 10 (07-05)  Alanine Aminotransferase (ALT/SGPT): 14 (06-12)  Alanine Aminotransferase (ALT/SGPT): 15 (02-22)  Aspartate Aminotransferase (AST/SGOT): 14 (07-07-24 @ 07:03)  Aspartate Aminotransferase (AST/SGOT): 17 (07-05-24 @ 13:40)  Aspartate Aminotransferase (AST/SGOT): 16 (06-12-24 @ 11:35)  Aspartate Aminotransferase (AST/SGOT): 19 (02-22-24 @ 09:00)  Bilirubin Total: 0.3 (07-07)  Bilirubin Total: 0.3 (07-05)  Bilirubin Total: 0.2 (06-12)  Bilirubin Total: 0.3 (02-22)      Trend LDH      Urinalysis Basic - ( 09 Jul 2024 07:10 )    Color: x / Appearance: x / SG: x / pH: x  Gluc: 100 mg/dL / Ketone: x  / Bili: x / Urobili: x   Blood: x / Protein: x / Nitrite: x   Leuk Esterase: x / RBC: x / WBC x   Sq Epi: x / Non Sq Epi: x / Bacteria: x        MICROBIOLOGY:        Culture - Urine (collected 05 Jul 2024 17:50)  Source: Catheterized None  Final Report:    50,000 - 99,000 CFU/mL Staphylococcus aureus  Organism: Staphylococcus aureus  Organism: Staphylococcus aureus    Sensitivities:      Method Type: LENCHO      -  Gentamicin: S <=1 Should not be used as monotherapy      -  Oxacillin: S <=0.25 Oxacillin predicts susceptibility for dicloxacillin, methicillin, and nafcillin      -  Penicillin: R 0.5      -  Rifampin: S <=1 Should not be used as monotherapy      -  Tetracycline: S <=1      -  Trimethoprim/Sulfamethoxazole: S <=0.5/9.5      -  Vancomycin: S 1    Urinalysis with Rflx Culture (collected 05 Jul 2024 17:50)    Culture - Urine (collected 07 Oct 2023 11:56)  Source: Suprapubic Suprapubic  Final Report:    Numerous Citrobacter koseri    Few Escherichia coli    Few Staphylococcus aureus    Rare Pseudomonas aeruginosa  Organism: Citrobacter koseri  Escherichia coli  Staphylococcus aureus  Pseudomonas aeruginosa  Organism: Pseudomonas aeruginosa    Sensitivities:      Method Type: LENCHO      -  Amikacin: S <=16      -  Aztreonam: S <=4      -  Cefepime: S 8      -  Ceftazidime: S 4      -  Ciprofloxacin: S <=0.25      -  Gentamicin: I 8      -  Imipenem: S 2      -  Levofloxacin: S <=0.5      -  Meropenem: S <=1      -  Piperacillin/Tazobactam: S <=8      -  Tobramycin: S <=2  Organism: Staphylococcus aureus    Sensitivities:      Method Type: LENCHO      -  Ampicillin/Sulbactam: S <=8/4      -  Cefazolin: S <=4      -  Gentamicin: S <=1 Should not be used as monotherapy      -  Oxacillin: S <=0.25 Oxacillin predicts susceptibility for dicloxacillin, methicillin, and nafcillin      -  Penicillin: R 8      -  Rifampin: S <=1 Should not be used as monotherapy      -  Tetracycline: S <=1      -  Trimethoprim/Sulfamethoxazole: S <=0.5/9.5      -  Vancomycin: S 1  Organism: Escherichia coli    Sensitivities:      Method Type: LENCHO      -  Amikacin: S <=16      -  Amoxicillin/Clavulanic Acid: S <=8/4      -  Ampicillin: S <=8 These ampicillin results predict results for amoxicillin      -  Ampicillin/Sulbactam: S <=4/2      -  Aztreonam: S <=4      -  Cefazolin: S <=2 For uncomplicated UTI with K. pneumoniae, E. coli, or P. mirablis: LENCHO <=16 is sensitive and LENCHO >=32 is resistant. This also predicts results for oral agents cefaclor, cefdinir, cefpodoxime, cefprozil, cefuroxime axetil, cephalexin and locarbef for uncomplicated UTI. Note that some isolates may be susceptible to these agents while testing resistant to cefazolin.      -  Cefepime: S <=2      -  Cefoxitin: S <=8      -  Ceftriaxone: S <=1      -  Cefuroxime: S <=4      -  Ciprofloxacin: S <=0.25      -  Ertapenem: S <=0.5      -  Gentamicin: S <=2      -  Imipenem: S <=1      -  Levofloxacin: S <=0.5      -  Meropenem: S <=1      -  Nitrofurantoin: S <=32 Should not be used to treat pyelonephritis      -  Piperacillin/Tazobactam: S <=8      -  Tobramycin: S <=2      -  Trimethoprim/Sulfamethoxazole: S <=0.5/9.5  Organism: Citrobacter koseri    Sensitivities:      Method Type: LENCHO      -  Amikacin: S <=16      -  Amoxicillin/Clavulanic Acid: S <=8/4      -  Ampicillin: R >16 These ampicillin results predict results for amoxicillin      -  Ampicillin/Sulbactam: S <=4/2      -  Aztreonam: S <=4      -  Cefazolin: S <=2      -  Cefepime: S <=2      -  Cefoxitin: S <=8      -  Ceftriaxone: S <=1      -  Ciprofloxacin: S <=0.25      -  Ertapenem: S <=0.5      -  Gentamicin: S <=2      -  Imipenem: S <=1      -  Levofloxacin: S <=0.5      -  Meropenem: S <=1      -  Nitrofurantoin: S <=32 Should not be used to treat pyelonephritis      -  Piperacillin/Tazobactam: S <=8      -  Tobramycin: S <=2      -  Trimethoprim/Sulfamethoxazole: S <=0.5/9.5    RADIOLOGY:  imaging below personally reviewed    < from: US Kidney and Bladder (07.05.24 @ 14:51) >    IMPRESSION:    Bilateral moderate hydronephrosis    Large heterogeneous structure within the bladder measuring 11.2 x 8.4 x   8.1 cm with possible vascular flow.    Differential includes hemorrhagic clots and/or bladder neoplasm.    Further evaluation is recommended to exclude the possibility of an   underlying bladder neoplasm.    Bladder volume is 807 cc    < end of copied text >

## 2024-07-09 NOTE — PROGRESS NOTE ADULT - TIME BILLING
I have personally seen and examined this patient.    I have reviewed all pertinent clinical information and reviewed all relevant imaging and diagnostic studies personally.   I discussed recommendations with the primary team.
I was physically present for the key portions of the evaluation and management [ E/M] service provided and agree with the plan which i have reviewed and edited where appropriate

## 2024-07-09 NOTE — PROGRESS NOTE ADULT - ASSESSMENT
70 y/o male with hx of atrial fibrillation (on Eliquis), overactive bladder, BPH , CVA with right sided weakness, multiple sclerosis, kidney stone, suprapubic catheter, history of penile implant, h/o hernia repair presents to the ED from Dr. Cedeno office for evaluation of gross hematuria for 9 days and increased weakness that he noticed this am.    ID is consulted for hematuria  WBC 12.19 > 10.35  DANIEL 1.6 > 1.2  UA with mix hematuria and pyuria, UCx MSSA  Prior UCx 10/2023 grew Citrobacter freudii, MSSA, Pseudomonas aeruginosa, E. coli    US renal and bladder 7/5  Bilateral moderate hydronephrosis  Large heterogeneous structure within the bladder measuring 11.2 x 8.4 x  8.1 cm with possible vascular flow.  Differential includes hemorrhagic clots and/or bladder neoplasm.  Further evaluation is recommended to exclude the possibility of an  underlying bladder neoplasm.  Bladder volume is 807 cc      IMPRESSION:  Possible UTI  Hematuria  Bladder lesion (clots vs neoplasm)  Afib on Eliquis  Immunosuppression / Immunosenescence which could result in poor clinical outcome      RECOMMENDATIONS:  - D/C meropenem  - Start IV Ancef 1g q8hrs for now pending repeat blood culture  - Urology follow up  - Offloading and frequent position changes, aspiration precaution  - Trend WBC, fever curve, transaminases, creatinine daily      Carolina Mckinley D.O.  Attending Physician  Division of Infectious Diseases  [facility]  Please contact me via [employer] Teams

## 2024-07-09 NOTE — PROGRESS NOTE ADULT - SUBJECTIVE AND OBJECTIVE BOX
SUBJECTIVE:    Patient is a 69y old Male who presents with a chief complaint of symptomatic anemia, gross hematuria (08 Jul 2024 13:26)    Currently admitted to medicine with the primary diagnosis of Symptomatic anemia       Today is hospital day 4d. This morning he is resting comfortably in bed and reports no new issues or overnight events.     ROS:   CONSTITUTIONAL: No weakness, fevers or chills   EYES/ENT: No visual changes; No vertigo or throat pain   NECK: No pain or stiffness   RESPIRATORY: No cough, wheezing, hemoptysis; No shortness of breath   CARDIOVASCULAR: No chest pain or palpitations   GASTROINTESTINAL: No abdominal or epigastric pain. No nausea, vomiting, or hematemesis; No diarrhea or constipation. No melena or hematochezia.  GENITOURINARY: No dysuria, frequency or hematuria  NEUROLOGICAL: No numbness or weakness  SKIN: No itching, rashes      PAST MEDICAL & SURGICAL HISTORY  Atrial fibrillation    Overactive bladder    BPH (benign prostatic hyperplasia)    CVA (cerebrovascular accident)    Multiple sclerosis    Kidney stone    Suprapubic catheter    Prediabetes    Hypothyroid    History of penile implant    History of suprapubic catheter    H/O hernia repair  abdominal with mesh      SOCIAL HISTORY:    ALLERGIES:  No Known Allergies    MEDICATIONS:  STANDING MEDICATIONS  apixaban 5 milliGRAM(s) Oral every 12 hours  chlorhexidine 2% Cloths 1 Application(s) Topical <User Schedule>  insulin lispro (ADMELOG) corrective regimen sliding scale   SubCutaneous three times a day before meals  levothyroxine 112 MICROGram(s) Oral daily  meropenem  IVPB      meropenem  IVPB 1000 milliGRAM(s) IV Intermittent every 8 hours  metoprolol succinate ER 25 milliGRAM(s) Oral daily  pantoprazole    Tablet 40 milliGRAM(s) Oral before breakfast  rOPINIRole 0.5 milliGRAM(s) Oral two times a day  senna 2 Tablet(s) Oral at bedtime  simvastatin 20 milliGRAM(s) Oral at bedtime  traZODone 100 milliGRAM(s) Oral at bedtime    PRN MEDICATIONS  acetaminophen     Tablet .. 650 milliGRAM(s) Oral every 6 hours PRN  dextrose Oral Gel 15 Gram(s) Oral once PRN  melatonin 3 milliGRAM(s) Oral at bedtime PRN  ondansetron Injectable 4 milliGRAM(s) IV Push every 8 hours PRN    VITALS:   T(F): 97.8  HR: 76  BP: 133/94  RR: 18  SpO2: 98%    LABS:  Negative for smoking/alcohol/drug use.                         10.0   10.71 )-----------( 229      ( 09 Jul 2024 07:10 )             31.4     07-09    141  |  101  |  15  ----------------------------<  100<H>  4.0   |  30  |  1.2    Ca    9.1      09 Jul 2024 07:10      PT/INR - ( 08 Jul 2024 07:09 )   PT: 14.00 sec;   INR: 1.23 ratio         PTT - ( 08 Jul 2024 07:09 )  PTT:27.2 sec  Urinalysis Basic - ( 09 Jul 2024 07:10 )    Color: x / Appearance: x / SG: x / pH: x  Gluc: 100 mg/dL / Ketone: x  / Bili: x / Urobili: x   Blood: x / Protein: x / Nitrite: x   Leuk Esterase: x / RBC: x / WBC x   Sq Epi: x / Non Sq Epi: x / Bacteria: x                RADIOLOGY:    PHYSICAL EXAM:  GEN: No acute distress  HEENT: normocephalic, atraumatic, aniceteric  LUNGS: Clear to auscultation bilaterally, no rales/wheezing/ rhonchi  HEART: S1/S2 present. RRR, no murmurs  ABD: Soft, non-tender, non-distended. Bowel sounds present  EXT: no edema   NEURO: AAOX3, normal affect      ASSESSMENT AND PLAN:      70 y/o male with hx of atrial fibrillation (on Eliquis), overactive bladder, BPH, CVA with right sided weakness, multiple sclerosis, kidney stone, suprapubic catheter, history of penile implant, h/o hernia repair presents to the ED from Dr. Cedeno office for evaluation of gross hematuria for 9 days and increased weakness that he noticed this am. Patient s/p cystolithopaxy POD#9. States that he has been having hematuria since the procedure. He had 1 episode of vomiting this am, c/o back pain which is chronic. Denies fevers, chills abdominal pain, back pain, nausea, headache, dysuria, CP, SOB.     # Complicated UTI   # Gross Hematuria sp cysto - resolved   # Symptomatic anemia sp 1 unit prbc in ED - resolved   # H/O Chronic Urinary retention sp Chronic SPC  # H/O BPH   - U/A + , fu urine clx sensitivity (Staph aureus)   - RBUS noted w/ clots sp irrigation with urology   - Urology recs: Monitor for clot retention/CBI, bladder irrigation- flush with 100 cc q6 hours  - Indwelling urinary catheter removed 7/7  - urology cleared the patient to resume AC   - ID EVAL appreciated- Meropenem  ; fu blood clx 7/9 (positive staph in urine)    #DANIEL - resolved   post obstructive  - rbus w/ clots and > 800 urine - sp SPC exchange and calvert placement in ED  - sp IVF LR   - held lasix     #H/O Chronic A fib - rate controlled now   - chadsvasc 3   - resumed eliquis   - c/w metoprolol (dose lowered to 25 mg qd)    # H/O CVA - on eliquis at home / cw statin    #H/O Hypothyroidism-c/w synthroid     #H/O DM2 - hold home po medications ; insulin inpatient; carb diet     dvt/ gi ppx/diet  dispo: acute (home pt)   handoff: fu urine clx  sensitivity / fu blood clx

## 2024-07-10 LAB
ANION GAP SERPL CALC-SCNC: 9 MMOL/L — SIGNIFICANT CHANGE UP (ref 7–14)
BASOPHILS # BLD AUTO: 0.05 K/UL — SIGNIFICANT CHANGE UP (ref 0–0.2)
BASOPHILS NFR BLD AUTO: 0.4 % — SIGNIFICANT CHANGE UP (ref 0–1)
BUN SERPL-MCNC: 15 MG/DL — SIGNIFICANT CHANGE UP (ref 10–20)
CALCIUM SERPL-MCNC: 8.9 MG/DL — SIGNIFICANT CHANGE UP (ref 8.4–10.5)
CHLORIDE SERPL-SCNC: 105 MMOL/L — SIGNIFICANT CHANGE UP (ref 98–110)
CO2 SERPL-SCNC: 26 MMOL/L — SIGNIFICANT CHANGE UP (ref 17–32)
CREAT SERPL-MCNC: 1.1 MG/DL — SIGNIFICANT CHANGE UP (ref 0.7–1.5)
EGFR: 72 ML/MIN/1.73M2 — SIGNIFICANT CHANGE UP
EOSINOPHIL # BLD AUTO: 0.26 K/UL — SIGNIFICANT CHANGE UP (ref 0–0.7)
EOSINOPHIL NFR BLD AUTO: 2.3 % — SIGNIFICANT CHANGE UP (ref 0–8)
GLUCOSE BLDC GLUCOMTR-MCNC: 113 MG/DL — HIGH (ref 70–99)
GLUCOSE BLDC GLUCOMTR-MCNC: 123 MG/DL — HIGH (ref 70–99)
GLUCOSE BLDC GLUCOMTR-MCNC: 126 MG/DL — HIGH (ref 70–99)
GLUCOSE BLDC GLUCOMTR-MCNC: 165 MG/DL — HIGH (ref 70–99)
GLUCOSE SERPL-MCNC: 105 MG/DL — HIGH (ref 70–99)
HCT VFR BLD CALC: 30.3 % — LOW (ref 42–52)
HGB BLD-MCNC: 10 G/DL — LOW (ref 14–18)
IMM GRANULOCYTES NFR BLD AUTO: 1.3 % — HIGH (ref 0.1–0.3)
LYMPHOCYTES # BLD AUTO: 1.73 K/UL — SIGNIFICANT CHANGE UP (ref 1.2–3.4)
LYMPHOCYTES # BLD AUTO: 15.2 % — LOW (ref 20.5–51.1)
MCHC RBC-ENTMCNC: 26.4 PG — LOW (ref 27–31)
MCHC RBC-ENTMCNC: 33 G/DL — SIGNIFICANT CHANGE UP (ref 32–37)
MCV RBC AUTO: 79.9 FL — LOW (ref 80–94)
MONOCYTES # BLD AUTO: 0.9 K/UL — HIGH (ref 0.1–0.6)
MONOCYTES NFR BLD AUTO: 7.9 % — SIGNIFICANT CHANGE UP (ref 1.7–9.3)
NEUTROPHILS # BLD AUTO: 8.31 K/UL — HIGH (ref 1.4–6.5)
NEUTROPHILS NFR BLD AUTO: 72.9 % — SIGNIFICANT CHANGE UP (ref 42.2–75.2)
NRBC # BLD: 0 /100 WBCS — SIGNIFICANT CHANGE UP (ref 0–0)
PLATELET # BLD AUTO: 235 K/UL — SIGNIFICANT CHANGE UP (ref 130–400)
PMV BLD: 11.1 FL — HIGH (ref 7.4–10.4)
POTASSIUM SERPL-MCNC: 4.3 MMOL/L — SIGNIFICANT CHANGE UP (ref 3.5–5)
POTASSIUM SERPL-SCNC: 4.3 MMOL/L — SIGNIFICANT CHANGE UP (ref 3.5–5)
RBC # BLD: 3.79 M/UL — LOW (ref 4.7–6.1)
RBC # FLD: 19 % — HIGH (ref 11.5–14.5)
SODIUM SERPL-SCNC: 140 MMOL/L — SIGNIFICANT CHANGE UP (ref 135–146)
WBC # BLD: 11.4 K/UL — HIGH (ref 4.8–10.8)
WBC # FLD AUTO: 11.4 K/UL — HIGH (ref 4.8–10.8)

## 2024-07-10 PROCEDURE — 99232 SBSQ HOSP IP/OBS MODERATE 35: CPT

## 2024-07-10 RX ORDER — CEFAZOLIN 10 G/1
1000 INJECTION, POWDER, FOR SOLUTION INTRAVENOUS EVERY 8 HOURS
Refills: 0 | Status: DISCONTINUED | OUTPATIENT
Start: 2024-07-10 | End: 2024-07-11

## 2024-07-10 RX ADMIN — CEFAZOLIN 100 MILLIGRAM(S): 10 INJECTION, POWDER, FOR SOLUTION INTRAVENOUS at 13:50

## 2024-07-10 RX ADMIN — ROPINIROLE HYDROCHLORIDE 0.5 MILLIGRAM(S): 0.5 TABLET, FILM COATED ORAL at 18:09

## 2024-07-10 RX ADMIN — PANTOPRAZOLE SODIUM 40 MILLIGRAM(S): 40 INJECTION, POWDER, FOR SOLUTION INTRAVENOUS at 05:42

## 2024-07-10 RX ADMIN — APIXABAN 5 MILLIGRAM(S): 5 TABLET, FILM COATED ORAL at 18:09

## 2024-07-10 RX ADMIN — ROPINIROLE HYDROCHLORIDE 0.5 MILLIGRAM(S): 0.5 TABLET, FILM COATED ORAL at 05:43

## 2024-07-10 RX ADMIN — MEROPENEM 100 MILLIGRAM(S): 500 INJECTION, POWDER, FOR SOLUTION INTRAVENOUS at 05:42

## 2024-07-10 RX ADMIN — Medication 112 MICROGRAM(S): at 05:43

## 2024-07-10 RX ADMIN — INSULIN LISPRO 1: 100 INJECTION, SOLUTION SUBCUTANEOUS at 17:08

## 2024-07-10 RX ADMIN — CEFAZOLIN 100 MILLIGRAM(S): 10 INJECTION, POWDER, FOR SOLUTION INTRAVENOUS at 21:09

## 2024-07-10 RX ADMIN — APIXABAN 5 MILLIGRAM(S): 5 TABLET, FILM COATED ORAL at 05:42

## 2024-07-10 RX ADMIN — Medication 25 MILLIGRAM(S): at 05:42

## 2024-07-10 RX ADMIN — Medication 20 MILLIGRAM(S): at 21:09

## 2024-07-10 RX ADMIN — TRAZODONE HYDROCHLORIDE 100 MILLIGRAM(S): 50 TABLET, FILM COATED ORAL at 21:09

## 2024-07-10 NOTE — PROGRESS NOTE ADULT - SUBJECTIVE AND OBJECTIVE BOX
68 y/o male with hx of atrial fibrillation (on Eliquis), overactive bladder, BPH, CVA with right sided weakness, multiple sclerosis, kidney stone, suprapubic catheter, history of penile implant, h/o hernia repair presents to the ED from Dr. Cedeno office for evaluation of gross hematuria for 9 days and increased weakness.    Today:  Seen at bedside, no new complaints.          REVIEW OF SYSTEMS:  No new complaints      MEDICATIONS  (STANDING):  apixaban 5 milliGRAM(s) Oral every 12 hours  ceFAZolin   IVPB 1000 milliGRAM(s) IV Intermittent every 8 hours  chlorhexidine 2% Cloths 1 Application(s) Topical <User Schedule>  insulin lispro (ADMELOG) corrective regimen sliding scale   SubCutaneous three times a day before meals  levothyroxine 112 MICROGram(s) Oral daily  metoprolol succinate ER 25 milliGRAM(s) Oral daily  pantoprazole    Tablet 40 milliGRAM(s) Oral before breakfast  rOPINIRole 0.5 milliGRAM(s) Oral two times a day  senna 2 Tablet(s) Oral at bedtime  simvastatin 20 milliGRAM(s) Oral at bedtime  traZODone 100 milliGRAM(s) Oral at bedtime    MEDICATIONS  (PRN):  acetaminophen     Tablet .. 650 milliGRAM(s) Oral every 6 hours PRN Temp greater or equal to 38C (100.4F), Mild Pain (1 - 3)  dextrose Oral Gel 15 Gram(s) Oral once PRN Blood Glucose LESS THAN 70 milliGRAM(s)/deciliter  melatonin 3 milliGRAM(s) Oral at bedtime PRN Insomnia  ondansetron Injectable 4 milliGRAM(s) IV Push every 8 hours PRN Nausea and/or Vomiting      Allergies  No Known Allergies        Vital Signs Last 24 Hrs  T(C): 36.8 (10 Jul 2024 13:23), Max: 36.8 (09 Jul 2024 21:00)  T(F): 98.2 (10 Jul 2024 13:23), Max: 98.2 (09 Jul 2024 21:00)  HR: 92 (10 Jul 2024 13:23) (73 - 92)  BP: 108/74 (10 Jul 2024 13:23) (108/74 - 148/91)  RR: 18 (10 Jul 2024 13:23) (18 - 18)  SpO2: 99% (10 Jul 2024 04:15) (99% - 100%)    Parameters below as of 10 Jul 2024 04:15  Patient On (Oxygen Delivery Method): room air        PHYSICAL EXAM:  GEN: No acute distress  HEENT: normocephalic, atraumatic, aniceteric  LUNGS: Clear to auscultation bilaterally, no rales/wheezing/ rhonchi  HEART: S1/S2 present. RRR, no murmurs  ABD: Soft, non-tender, non-distended. Bowel sounds present  EXT: no edema   NEURO: AAOX3, normal affect    LABS:                        10.0   11.40 )-----------( 235      ( 10 Jul 2024 06:37 )             30.3     07-10    140  |  105  |  15  ----------------------------<  105<H>  4.3   |  26  |  1.1    Ca    8.9      10 Jul 2024 06:37        Urinalysis Basic - ( 10 Jul 2024 06:37 )    Color: x / Appearance: x / SG: x / pH: x  Gluc: 105 mg/dL / Ketone: x  / Bili: x / Urobili: x   Blood: x / Protein: x / Nitrite: x   Leuk Esterase: x / RBC: x / WBC x   Sq Epi: x / Non Sq Epi: x / Bacteria: x

## 2024-07-10 NOTE — PROGRESS NOTE ADULT - PROVIDER SPECIALTY LIST ADULT
Hospitalist
Urology
Hospitalist
Urology
Urology
Hospitalist
Infectious Disease

## 2024-07-10 NOTE — PROGRESS NOTE ADULT - REASON FOR ADMISSION
symptomatic anemia, gross hematuria

## 2024-07-10 NOTE — PROGRESS NOTE ADULT - ASSESSMENT
70 y/o male with hx of atrial fibrillation (on Eliquis), overactive bladder, BPH, CVA with right sided weakness, multiple sclerosis, kidney stone, suprapubic catheter, history of penile implant, h/o hernia repair presents to the ED from Dr. Cedeno office for evaluation of gross hematuria for 9 days and increased weakness.    # Complicated UTI   # Gross Hematuria sp cysto - resolved   # Symptomatic anemia sp 1 unit prbc in ED - resolved   # H/O Chronic Urinary retention sp Chronic SPC  # H/O BPH   - U/A + , fu urine clx sensitivity (Staph aureus)   - RBUS noted w/ clots sp irrigation with urology   - Urology recs: Monitor for clot retention/CBI, bladder irrigation- flush with 100 cc q6 hours  - Indwelling urinary catheter removed 7/7  - urology cleared the patient to resume AC   - ID EVAL appreciated-  - D/C meropenem  - Start IV Ancef 1g q8hrs for now pending repeat blood culture    #DANIEL - resolved   post obstructive  - rbus w/ clots and > 800 urine - sp SPC exchange and calvert placement in ED  - sp IVF LR   - held lasix     #H/O Chronic A fib - rate controlled now   - chadsvasc 3   - resumed eliquis   - c/w metoprolol (dose lowered to 25 mg qd)    # H/O CVA - on eliquis at home / cw statin    #H/O Hypothyroidism-c/w synthroid     #H/O DM2 - hold home po medications ; insulin inpatient; carb diet     dvt/ gi ppx/diet  dispo: acute (home pt)   handoff: fu urine clx  sensitivity / fu blood clx

## 2024-07-11 ENCOUNTER — TRANSCRIPTION ENCOUNTER (OUTPATIENT)
Age: 69
End: 2024-07-11

## 2024-07-11 VITALS
SYSTOLIC BLOOD PRESSURE: 138 MMHG | OXYGEN SATURATION: 99 % | TEMPERATURE: 98 F | HEART RATE: 95 BPM | DIASTOLIC BLOOD PRESSURE: 84 MMHG | RESPIRATION RATE: 18 BRPM

## 2024-07-11 LAB
GLUCOSE BLDC GLUCOMTR-MCNC: 106 MG/DL — HIGH (ref 70–99)
GLUCOSE BLDC GLUCOMTR-MCNC: 119 MG/DL — HIGH (ref 70–99)

## 2024-07-11 PROCEDURE — 99239 HOSP IP/OBS DSCHRG MGMT >30: CPT

## 2024-07-11 RX ORDER — SULFAMETHOXAZOLE AND TRIMETHOPRIM 800; 160 MG/1; MG/1
1 TABLET ORAL
Qty: 6 | Refills: 0
Start: 2024-07-11 | End: 2024-07-13

## 2024-07-11 RX ORDER — SIMVASTATIN 40 MG
1 TABLET ORAL
Qty: 0 | Refills: 0 | DISCHARGE
Start: 2024-07-11

## 2024-07-11 RX ORDER — METOPROLOL TARTRATE 50 MG
1 TABLET ORAL
Refills: 0 | DISCHARGE

## 2024-07-11 RX ORDER — APIXABAN 5 MG/1
1 TABLET, FILM COATED ORAL
Qty: 0 | Refills: 0 | DISCHARGE
Start: 2024-07-11

## 2024-07-11 RX ORDER — LEVOTHYROXINE SODIUM 25 MCG
1 TABLET ORAL
Qty: 0 | Refills: 0 | DISCHARGE
Start: 2024-07-11

## 2024-07-11 RX ORDER — METOPROLOL TARTRATE 50 MG
1 TABLET ORAL
Qty: 30 | Refills: 0
Start: 2024-07-11 | End: 2024-08-09

## 2024-07-11 RX ADMIN — APIXABAN 5 MILLIGRAM(S): 5 TABLET, FILM COATED ORAL at 05:09

## 2024-07-11 RX ADMIN — Medication 1 APPLICATION(S): at 05:09

## 2024-07-11 RX ADMIN — Medication 112 MICROGRAM(S): at 05:09

## 2024-07-11 RX ADMIN — CEFAZOLIN 100 MILLIGRAM(S): 10 INJECTION, POWDER, FOR SOLUTION INTRAVENOUS at 05:08

## 2024-07-11 RX ADMIN — PANTOPRAZOLE SODIUM 40 MILLIGRAM(S): 40 INJECTION, POWDER, FOR SOLUTION INTRAVENOUS at 05:09

## 2024-07-11 RX ADMIN — ROPINIROLE HYDROCHLORIDE 0.5 MILLIGRAM(S): 0.5 TABLET, FILM COATED ORAL at 05:09

## 2024-07-11 RX ADMIN — Medication 25 MILLIGRAM(S): at 05:09

## 2024-07-11 NOTE — DISCHARGE NOTE NURSING/CASE MANAGEMENT/SOCIAL WORK - PATIENT PORTAL LINK FT
You can access the FollowMyHealth Patient Portal offered by [facility] by registering at the following website: [email or web address]. By joining [facility]’s FollowMyHealth portal, you will also be able to view your health information using other applications (apps) compatible with our system.

## 2024-07-11 NOTE — DISCHARGE NOTE PROVIDER - HOSPITAL COURSE
68 y/o male with hx of atrial fibrillation (on Eliquis), overactive bladder, BPH, CVA with right sided weakness, multiple sclerosis, kidney stone, suprapubic catheter, history of penile implant, h/o hernia repair presents to the ED from Dr. Cedeno office for evaluation of gross hematuria for 9 days and increased weakness.    # Complicated UTI   # Gross Hematuria sp cysto - resolved   # Symptomatic anemia sp 1 unit prbc in ED - resolved   # H/O Chronic Urinary retention sp Chronic SPC  # H/O BPH   - U/A + , fu urine clx sensitivity (Staph aureus)   - RBUS noted w/ clots sp irrigation with urology   - Urology recs: Monitor for clot retention/CBI, bladder irrigation- flush with 100 cc q6 hours  - Indwelling urinary catheter removed 7/7  - urology cleared the patient to resume AC   - ID EVAL appreciated-  - D/C meropenem  - Spoke with ID, can switch to Bactrim PO to finish course as blood Cx neg.    #DANIEL - resolved   post obstructive  - rbus w/ clots and > 800 urine - sp SPC exchange and calvert placement in ED  - sp IVF LR     #H/O Chronic A fib - rate controlled now   - chadsvasc 3   - resumed eliquis   - c/w metoprolol (dose lowered to 25 mg qd)    # H/O CVA - on eliquis at home / cw statin    #H/O Hypothyroidism-c/w synthroid     #H/O DM2 - home regimen to continue on DC 70 y/o male with hx of atrial fibrillation (on Eliquis), overactive bladder, BPH, CVA with right sided weakness, multiple sclerosis, kidney stone, suprapubic catheter, history of penile implant, h/o hernia repair presents to the ED from Dr. Cedeno office for evaluation of gross hematuria for 9 days and increased weakness.    # Complicated UTI   # Gross Hematuria sp cysto - resolved   # Symptomatic anemia sp 1 unit prbc in ED - resolved   # H/O Chronic Urinary retention sp Chronic SPC  # H/O BPH   - U/A + , fu urine clx sensitivity (Staph aureus)   - RBUS noted w/ clots sp irrigation with urology   - Urology recs: Monitor for clot retention/CBI, bladder irrigation- flush with 100 cc q6 hours  - Indwelling urinary catheter removed 7/7  - urology cleared the patient to resume AC   - ID EVAL appreciated-  - D/C meropenem  - Spoke with ID, can switch to Bactrim PO to finish course as blood Cx neg.    #DANIEL - resolved   post obstructive  - rbus w/ clots and > 800 urine - sp SPC exchange and calvert placement in ED  - sp IVF LR     #H/O Chronic A fib - rate controlled now   - chadsvasc 3   - resumed eliquis   - c/w metoprolol (dose lowered to 25 mg qd)    # H/O CVA - on eliquis at home / cw statin    #H/O Hypothyroidism-c/w synthroid     #H/O DM2 - home regimen to continue on DC    Spent 45 minutes on discharge planning

## 2024-07-11 NOTE — DISCHARGE NOTE NURSING/CASE MANAGEMENT/SOCIAL WORK - NSDCPEFALRISK_GEN_ALL_CORE
For information on Fall & Injury Prevention, visit: [email or web address] OR  [email or web address] OR  https://www.cdc.gov/steadi/patient.html

## 2024-07-11 NOTE — DISCHARGE NOTE PROVIDER - NSDCCPCAREPLAN_GEN_ALL_CORE_FT
PRINCIPAL DISCHARGE DIAGNOSIS  Diagnosis: Acute UTI  Assessment and Plan of Treatment: We treated you for a urinary tract infection.  Finish the antibiotic as prescribed and follow with your family doctor within 1-2 weeks after discharge.

## 2024-07-11 NOTE — DISCHARGE NOTE PROVIDER - NSDCFUSCHEDAPPT_GEN_ALL_CORE_FT
Jada Reynolds  [facility] Physician [facility]  UROLOGY [address]  Scheduled Appointment: 08/14/2024

## 2024-07-11 NOTE — DISCHARGE NOTE PROVIDER - CARE PROVIDER_API CALL
Joni Hooks  Family Medicine  [address] [place] [address]  Phone: [phone]  Fax: [phone]  Follow Up Time:     Jada Reynolds NP in Family Health  [address] [place] [address]  Phone: [phone]  Fax: [phone]  Follow Up Time:

## 2024-07-11 NOTE — DISCHARGE NOTE PROVIDER - NSDCMRMEDTOKEN_GEN_ALL_CORE_FT
alendronate 70 mg oral tablet: 1 tab(s) orally once a week  apixaban 5 mg oral tablet: 1 tab(s) orally every 12 hours  Bactrim  mg-160 mg oral tablet: 1 tab(s) orally 2 times a day  Lasix 20 mg oral tablet: 1 tab(s) orally once a day  levothyroxine 112 mcg (0.112 mg) oral tablet: 1 tab(s) orally once a day  metFORMIN 500 mg oral tablet, extended release: 1 tab(s) orally 2 times a day  Metoprolol Succinate ER 25 mg oral tablet, extended release: 1 tab(s) orally once a day  omeprazole 20 mg oral delayed release capsule: 1 cap(s) orally once a day  rOPINIRole 0.5 mg oral tablet: 1 tab(s) orally 2 times a day  simvastatin 20 mg oral tablet: 1 tab(s) orally once a day (at bedtime)  traZODone 100 mg oral tablet: 1 tab(s) orally once a day

## 2024-07-15 LAB
CULTURE RESULTS: SIGNIFICANT CHANGE UP
CULTURE RESULTS: SIGNIFICANT CHANGE UP
SPECIMEN SOURCE: SIGNIFICANT CHANGE UP
SPECIMEN SOURCE: SIGNIFICANT CHANGE UP

## 2024-08-14 ENCOUNTER — APPOINTMENT (OUTPATIENT)
Dept: UROLOGY | Facility: CLINIC | Age: 69
End: 2024-08-14
Payer: MEDICARE

## 2024-08-14 DIAGNOSIS — R31.0 GROSS HEMATURIA: ICD-10-CM

## 2024-08-14 DIAGNOSIS — R33.9 RETENTION OF URINE, UNSPECIFIED: ICD-10-CM

## 2024-08-14 DIAGNOSIS — E11.9 TYPE 2 DIABETES MELLITUS W/OUT COMPLICATIONS: ICD-10-CM

## 2024-08-14 DIAGNOSIS — N13.8 BENIGN PROSTATIC HYPERPLASIA WITH LOWER URINARY TRACT SYMPMS: ICD-10-CM

## 2024-08-14 DIAGNOSIS — N21.0 CALCULUS IN BLADDER: ICD-10-CM

## 2024-08-14 DIAGNOSIS — N40.1 BENIGN PROSTATIC HYPERPLASIA WITH LOWER URINARY TRACT SYMPMS: ICD-10-CM

## 2024-08-14 PROCEDURE — 51705 CHANGE OF BLADDER TUBE: CPT

## 2024-09-25 ENCOUNTER — APPOINTMENT (OUTPATIENT)
Dept: UROLOGY | Facility: CLINIC | Age: 69
End: 2024-09-25
Payer: MEDICARE

## 2024-09-25 DIAGNOSIS — N13.8 BENIGN PROSTATIC HYPERPLASIA WITH LOWER URINARY TRACT SYMPMS: ICD-10-CM

## 2024-09-25 DIAGNOSIS — R33.9 RETENTION OF URINE, UNSPECIFIED: ICD-10-CM

## 2024-09-25 DIAGNOSIS — N40.1 BENIGN PROSTATIC HYPERPLASIA WITH LOWER URINARY TRACT SYMPMS: ICD-10-CM

## 2024-09-25 PROCEDURE — 51705 CHANGE OF BLADDER TUBE: CPT

## 2024-11-06 ENCOUNTER — APPOINTMENT (OUTPATIENT)
Dept: UROLOGY | Facility: CLINIC | Age: 69
End: 2024-11-06
Payer: MEDICARE

## 2024-11-06 DIAGNOSIS — E11.9 TYPE 2 DIABETES MELLITUS W/OUT COMPLICATIONS: ICD-10-CM

## 2024-11-06 DIAGNOSIS — R33.9 RETENTION OF URINE, UNSPECIFIED: ICD-10-CM

## 2024-11-06 DIAGNOSIS — N40.1 BENIGN PROSTATIC HYPERPLASIA WITH LOWER URINARY TRACT SYMPMS: ICD-10-CM

## 2024-11-06 DIAGNOSIS — G36.0 NEUROMYELITIS OPTICA [DEVIC]: ICD-10-CM

## 2024-11-06 DIAGNOSIS — N13.8 BENIGN PROSTATIC HYPERPLASIA WITH LOWER URINARY TRACT SYMPMS: ICD-10-CM

## 2024-11-06 PROCEDURE — 51705 CHANGE OF BLADDER TUBE: CPT

## 2024-12-18 ENCOUNTER — APPOINTMENT (OUTPATIENT)
Dept: UROLOGY | Facility: CLINIC | Age: 69
End: 2024-12-18
Payer: MEDICARE

## 2024-12-18 DIAGNOSIS — R33.9 RETENTION OF URINE, UNSPECIFIED: ICD-10-CM

## 2024-12-18 DIAGNOSIS — E11.9 TYPE 2 DIABETES MELLITUS W/OUT COMPLICATIONS: ICD-10-CM

## 2024-12-18 DIAGNOSIS — N13.8 BENIGN PROSTATIC HYPERPLASIA WITH LOWER URINARY TRACT SYMPMS: ICD-10-CM

## 2024-12-18 DIAGNOSIS — N40.1 BENIGN PROSTATIC HYPERPLASIA WITH LOWER URINARY TRACT SYMPMS: ICD-10-CM

## 2024-12-18 PROCEDURE — 51705 CHANGE OF BLADDER TUBE: CPT

## 2025-01-29 ENCOUNTER — APPOINTMENT (OUTPATIENT)
Dept: UROLOGY | Facility: CLINIC | Age: 70
End: 2025-01-29
Payer: MEDICARE

## 2025-01-29 DIAGNOSIS — N40.1 BENIGN PROSTATIC HYPERPLASIA WITH LOWER URINARY TRACT SYMPMS: ICD-10-CM

## 2025-01-29 DIAGNOSIS — N13.8 BENIGN PROSTATIC HYPERPLASIA WITH LOWER URINARY TRACT SYMPMS: ICD-10-CM

## 2025-01-29 DIAGNOSIS — G36.0 NEUROMYELITIS OPTICA [DEVIC]: ICD-10-CM

## 2025-01-29 DIAGNOSIS — E11.9 TYPE 2 DIABETES MELLITUS W/OUT COMPLICATIONS: ICD-10-CM

## 2025-01-29 DIAGNOSIS — R33.9 RETENTION OF URINE, UNSPECIFIED: ICD-10-CM

## 2025-01-29 PROCEDURE — 51705 CHANGE OF BLADDER TUBE: CPT

## 2025-03-12 ENCOUNTER — APPOINTMENT (OUTPATIENT)
Dept: UROLOGY | Facility: CLINIC | Age: 70
End: 2025-03-12
Payer: MEDICARE

## 2025-03-12 DIAGNOSIS — E11.9 TYPE 2 DIABETES MELLITUS W/OUT COMPLICATIONS: ICD-10-CM

## 2025-03-12 DIAGNOSIS — N13.8 BENIGN PROSTATIC HYPERPLASIA WITH LOWER URINARY TRACT SYMPMS: ICD-10-CM

## 2025-03-12 DIAGNOSIS — N40.1 BENIGN PROSTATIC HYPERPLASIA WITH LOWER URINARY TRACT SYMPMS: ICD-10-CM

## 2025-03-12 DIAGNOSIS — R33.9 RETENTION OF URINE, UNSPECIFIED: ICD-10-CM

## 2025-03-12 PROCEDURE — 51705 CHANGE OF BLADDER TUBE: CPT

## 2025-04-23 ENCOUNTER — APPOINTMENT (OUTPATIENT)
Dept: UROLOGY | Facility: CLINIC | Age: 70
End: 2025-04-23
Payer: MEDICARE

## 2025-04-23 DIAGNOSIS — R33.9 RETENTION OF URINE, UNSPECIFIED: ICD-10-CM

## 2025-04-23 DIAGNOSIS — E11.9 TYPE 2 DIABETES MELLITUS W/OUT COMPLICATIONS: ICD-10-CM

## 2025-04-23 DIAGNOSIS — G36.0 NEUROMYELITIS OPTICA [DEVIC]: ICD-10-CM

## 2025-04-23 DIAGNOSIS — N13.8 BENIGN PROSTATIC HYPERPLASIA WITH LOWER URINARY TRACT SYMPMS: ICD-10-CM

## 2025-04-23 DIAGNOSIS — N40.1 BENIGN PROSTATIC HYPERPLASIA WITH LOWER URINARY TRACT SYMPMS: ICD-10-CM

## 2025-04-23 PROCEDURE — 51705 CHANGE OF BLADDER TUBE: CPT

## 2025-06-04 ENCOUNTER — APPOINTMENT (OUTPATIENT)
Dept: UROLOGY | Facility: CLINIC | Age: 70
End: 2025-06-04
Payer: MEDICARE

## 2025-06-04 DIAGNOSIS — G36.0 NEUROMYELITIS OPTICA [DEVIC]: ICD-10-CM

## 2025-06-04 DIAGNOSIS — N13.8 BENIGN PROSTATIC HYPERPLASIA WITH LOWER URINARY TRACT SYMPMS: ICD-10-CM

## 2025-06-04 DIAGNOSIS — E11.9 TYPE 2 DIABETES MELLITUS W/OUT COMPLICATIONS: ICD-10-CM

## 2025-06-04 DIAGNOSIS — R33.9 RETENTION OF URINE, UNSPECIFIED: ICD-10-CM

## 2025-06-04 DIAGNOSIS — N40.1 BENIGN PROSTATIC HYPERPLASIA WITH LOWER URINARY TRACT SYMPMS: ICD-10-CM

## 2025-06-04 PROCEDURE — 51705 CHANGE OF BLADDER TUBE: CPT

## 2025-07-16 ENCOUNTER — APPOINTMENT (OUTPATIENT)
Dept: UROLOGY | Facility: CLINIC | Age: 70
End: 2025-07-16
Payer: MEDICARE

## 2025-07-16 PROCEDURE — 51705 CHANGE OF BLADDER TUBE: CPT

## 2025-08-27 ENCOUNTER — APPOINTMENT (OUTPATIENT)
Dept: UROLOGY | Facility: CLINIC | Age: 70
End: 2025-08-27
Payer: MEDICARE

## 2025-08-27 DIAGNOSIS — E11.9 TYPE 2 DIABETES MELLITUS W/OUT COMPLICATIONS: ICD-10-CM

## 2025-08-27 DIAGNOSIS — N40.1 BENIGN PROSTATIC HYPERPLASIA WITH LOWER URINARY TRACT SYMPMS: ICD-10-CM

## 2025-08-27 DIAGNOSIS — R33.9 RETENTION OF URINE, UNSPECIFIED: ICD-10-CM

## 2025-08-27 DIAGNOSIS — N13.8 BENIGN PROSTATIC HYPERPLASIA WITH LOWER URINARY TRACT SYMPMS: ICD-10-CM

## 2025-08-27 DIAGNOSIS — G36.0 NEUROMYELITIS OPTICA [DEVIC]: ICD-10-CM

## 2025-08-27 PROCEDURE — 51705 CHANGE OF BLADDER TUBE: CPT

## 2025-09-17 ENCOUNTER — APPOINTMENT (OUTPATIENT)
Dept: UROLOGY | Facility: CLINIC | Age: 70
End: 2025-09-17
Payer: MEDICARE

## 2025-09-17 DIAGNOSIS — N13.8 BENIGN PROSTATIC HYPERPLASIA WITH LOWER URINARY TRACT SYMPMS: ICD-10-CM

## 2025-09-17 DIAGNOSIS — R33.9 RETENTION OF URINE, UNSPECIFIED: ICD-10-CM

## 2025-09-17 DIAGNOSIS — E11.9 TYPE 2 DIABETES MELLITUS W/OUT COMPLICATIONS: ICD-10-CM

## 2025-09-17 DIAGNOSIS — N40.1 BENIGN PROSTATIC HYPERPLASIA WITH LOWER URINARY TRACT SYMPMS: ICD-10-CM

## 2025-09-17 PROCEDURE — 51705 CHANGE OF BLADDER TUBE: CPT
